# Patient Record
Sex: FEMALE | Race: WHITE | Employment: FULL TIME | URBAN - METROPOLITAN AREA
[De-identification: names, ages, dates, MRNs, and addresses within clinical notes are randomized per-mention and may not be internally consistent; named-entity substitution may affect disease eponyms.]

---

## 2017-01-02 ENCOUNTER — TELEPHONE (OUTPATIENT)
Dept: FAMILY MEDICINE CLINIC | Age: 57
End: 2017-01-02

## 2017-01-02 DIAGNOSIS — E78.5 HYPERLIPIDEMIA, UNSPECIFIED HYPERLIPIDEMIA TYPE: Primary | ICD-10-CM

## 2017-11-13 RX ORDER — PAROXETINE HYDROCHLORIDE 20 MG/1
20 TABLET, FILM COATED ORAL EVERY MORNING
Qty: 30 TABLET | Refills: 0 | Status: SHIPPED | OUTPATIENT
Start: 2017-11-13 | End: 2017-12-15 | Stop reason: SDUPTHER

## 2017-11-13 NOTE — TELEPHONE ENCOUNTER
Date of last visit:  12/7/2016  Date of next visit:  Visit date not found    Requested Prescriptions     Pending Prescriptions Disp Refills    PARoxetine (PAXIL) 20 MG tablet [Pharmacy Med Name: PAROXETINE HCL 20 MG TABLET] 90 tablet 0     Sig: TAKE 1 TABLET BY MOUTH EVERY MORNING

## 2017-12-15 ENCOUNTER — OFFICE VISIT (OUTPATIENT)
Dept: FAMILY MEDICINE CLINIC | Age: 57
End: 2017-12-15

## 2017-12-15 VITALS
HEART RATE: 82 BPM | SYSTOLIC BLOOD PRESSURE: 136 MMHG | RESPIRATION RATE: 14 BRPM | DIASTOLIC BLOOD PRESSURE: 74 MMHG | BODY MASS INDEX: 26.54 KG/M2 | HEIGHT: 63 IN | WEIGHT: 149.8 LBS

## 2017-12-15 DIAGNOSIS — R00.2 PALPITATIONS: ICD-10-CM

## 2017-12-15 DIAGNOSIS — Z13.220 SCREENING FOR HYPERLIPIDEMIA: ICD-10-CM

## 2017-12-15 DIAGNOSIS — Z13.1 SCREENING FOR DIABETES MELLITUS: ICD-10-CM

## 2017-12-15 DIAGNOSIS — F41.0 PANIC ATTACKS: ICD-10-CM

## 2017-12-15 DIAGNOSIS — M89.8X1 PAIN OF LEFT SCAPULA: ICD-10-CM

## 2017-12-15 PROCEDURE — 99213 OFFICE O/P EST LOW 20 MIN: CPT | Performed by: FAMILY MEDICINE

## 2017-12-15 RX ORDER — PAROXETINE HYDROCHLORIDE 20 MG/1
20 TABLET, FILM COATED ORAL EVERY MORNING
Qty: 90 TABLET | Refills: 1 | Status: SHIPPED | OUTPATIENT
Start: 2017-12-15 | End: 2018-01-17 | Stop reason: SDUPTHER

## 2017-12-15 RX ORDER — CHLORAL HYDRATE 500 MG
3000 CAPSULE ORAL DAILY
COMMUNITY
End: 2021-12-17

## 2017-12-15 ASSESSMENT — PATIENT HEALTH QUESTIONNAIRE - PHQ9
SUM OF ALL RESPONSES TO PHQ9 QUESTIONS 1 & 2: 0
1. LITTLE INTEREST OR PLEASURE IN DOING THINGS: 0
SUM OF ALL RESPONSES TO PHQ QUESTIONS 1-9: 0
2. FEELING DOWN, DEPRESSED OR HOPELESS: 0

## 2017-12-15 ASSESSMENT — ENCOUNTER SYMPTOMS
ABDOMINAL PAIN: 0
EYES NEGATIVE: 1
COUGH: 0
CONSTIPATION: 0
CHEST TIGHTNESS: 0
VOMITING: 0
SHORTNESS OF BREATH: 0
DIARRHEA: 0
NAUSEA: 0

## 2017-12-15 NOTE — PROGRESS NOTES
Visit Date: 12/15/2017    Costa Perez is a 65 y.o. female who presents today for:  Chief Complaint   Patient presents with    Check-Up  Left shoulder blade is different than the right, it sticks out with certain movements and she feels that it makes crack noises and sometimes is sore. No injury. HPI:     HPI    has a current medication list which includes the following prescription(s): fish oil, paroxetine, triamcinolone, IFC18, b complex-c-folic acid, pseudoephedrine, and probiotic product. No Known Allergies    Social History   Substance Use Topics    Smoking status: Never Smoker    Smokeless tobacco: Never Used    Alcohol use No       History reviewed. No pertinent past medical history. Past Surgical History:   Procedure Laterality Date    ADENOIDECTOMY      APPENDECTOMY      CATARACT REMOVAL Right 11/12/15    Dr Gloria Gilbert MYRINGOTOMY AND TYMPANOSTOMY TUBE PLACEMENT      SKIN CANCER EXCISION Right 11/11/15    shoulder, Dr Homar Barragan History   Problem Relation Age of Onset    Heart Disease Mother      Subjective:      Review of Systems   Constitutional: Negative for activity change, appetite change, diaphoresis, fatigue and fever. HENT: Negative. Eyes: Negative. Respiratory: Negative for cough, chest tightness and shortness of breath. Cardiovascular: Positive for palpitations (on and off for long time, non sustained. Had a work up in the past and was negative). Negative for chest pain and leg swelling. Gastrointestinal: Negative for abdominal pain, constipation, diarrhea, nausea and vomiting. Genitourinary: Negative. Musculoskeletal: Positive for arthralgias. Having foot problems and is seeing Dr Sukhi Danielson   Skin: Negative. Negative for rash. Neurological: Negative for dizziness, syncope, weakness, light-headedness, numbness and headaches. Psychiatric/Behavioral: Negative.         Objective:   BP morning     Current Outpatient Prescriptions   Medication Sig Dispense Refill    Omega-3 Fatty Acids (FISH OIL) 1000 MG CAPS Take 3,000 mg by mouth daily      PARoxetine (PAXIL) 20 MG tablet Take 1 tablet by mouth every morning 90 tablet 1    triamcinolone (KENALOG) 0.1 % cream APPLY TWICE A DAY TO AFFECTED AREA --DO NOT APPLY TO FACE OR GROIN  0    Coenzyme Q10 (COQ10) 50 MG CAPS Take  by mouth.  B Complex-C-Folic Acid (B-COMPLEX BALANCED PO) Take 1 tablet by mouth daily       pseudoephedrine (SUDAFED) 30 MG tablet Take 30 mg by mouth daily.  Probiotic Product (PROBIOTIC & ACIDOPHILUS EX ST PO) Take 1 tablet by mouth daily        No current facility-administered medications for this visit. Orders Placed This Encounter   Procedures    XR SCAPULA LEFT (COMPLETE)     Standing Status:   Future     Standing Expiration Date:   12/15/2018    Lipid Panel     Standing Status:   Future     Standing Expiration Date:   12/15/2018     Order Specific Question:   Is Patient Fasting?/# of Hours     Answer:   yes 12 hours    Glucose     Standing Status:   Future     Standing Expiration Date:   12/15/2018    EKG 12 Lead     Standing Status:   Future     Standing Expiration Date:   12/15/2018     Order Specific Question:   Reason for Exam?     Answer: Tachycardia     Continue current medications. Return in about 6 months (around 6/15/2018) for Panic attacks. Discussed use, benefit, and side effects of prescribed medications. All patient questions answered. Pt voiced understanding. Instructed to continue current medications, diet and exercise. Patient agreed with treatment plan.

## 2017-12-22 ENCOUNTER — HOSPITAL ENCOUNTER (OUTPATIENT)
Dept: GENERAL RADIOLOGY | Age: 57
Discharge: HOME | End: 2017-12-22
Payer: COMMERCIAL

## 2017-12-22 ENCOUNTER — HOSPITAL ENCOUNTER (OUTPATIENT)
Age: 57
Discharge: HOME | End: 2017-12-22
Payer: COMMERCIAL

## 2017-12-22 ENCOUNTER — HOSPITAL ENCOUNTER (OUTPATIENT)
Dept: WOMENS IMAGING | Age: 57
Discharge: HOME | End: 2017-12-22
Payer: COMMERCIAL

## 2017-12-22 DIAGNOSIS — Z13.9 VISIT FOR SCREENING: ICD-10-CM

## 2017-12-22 DIAGNOSIS — M89.8X1 PAIN OF LEFT SCAPULA: ICD-10-CM

## 2017-12-22 PROCEDURE — 73010 X-RAY EXAM OF SHOULDER BLADE: CPT

## 2017-12-22 PROCEDURE — 77063 BREAST TOMOSYNTHESIS BI: CPT

## 2018-01-02 ENCOUNTER — HOSPITAL ENCOUNTER (OUTPATIENT)
Dept: WOMENS IMAGING | Age: 58
Discharge: HOME | End: 2018-01-02
Payer: COMMERCIAL

## 2018-01-02 ENCOUNTER — APPOINTMENT (OUTPATIENT)
Dept: WOMENS IMAGING | Age: 58
End: 2018-01-02
Payer: COMMERCIAL

## 2018-01-02 DIAGNOSIS — N63.0 BREAST NODULE: ICD-10-CM

## 2018-01-02 PROCEDURE — G0279 TOMOSYNTHESIS, MAMMO: HCPCS

## 2018-01-17 RX ORDER — PAROXETINE HYDROCHLORIDE 20 MG/1
20 TABLET, FILM COATED ORAL EVERY MORNING
Qty: 30 TABLET | Refills: 0 | Status: SHIPPED | OUTPATIENT
Start: 2018-01-17 | End: 2018-02-24 | Stop reason: SDUPTHER

## 2018-02-26 RX ORDER — PAROXETINE HYDROCHLORIDE 20 MG/1
20 TABLET, FILM COATED ORAL EVERY MORNING
Qty: 30 TABLET | Refills: 0 | Status: SHIPPED | OUTPATIENT
Start: 2018-02-26 | End: 2018-04-27 | Stop reason: SDUPTHER

## 2018-02-26 NOTE — TELEPHONE ENCOUNTER
Date of last visit:  12/15/2017  Date of next visit:  Visit date not found    Requested Prescriptions     Pending Prescriptions Disp Refills    PARoxetine (PAXIL) 20 MG tablet [Pharmacy Med Name: PAROXETINE HCL 20 MG TABLET] 30 tablet 0     Sig: TAKE 1 TABLET BY MOUTH EVERY MORNING

## 2018-04-27 ENCOUNTER — OFFICE VISIT (OUTPATIENT)
Dept: FAMILY MEDICINE CLINIC | Age: 58
End: 2018-04-27

## 2018-04-27 VITALS
SYSTOLIC BLOOD PRESSURE: 122 MMHG | HEART RATE: 68 BPM | RESPIRATION RATE: 14 BRPM | HEIGHT: 63 IN | DIASTOLIC BLOOD PRESSURE: 76 MMHG

## 2018-04-27 DIAGNOSIS — R00.2 HEART PALPITATIONS: Primary | ICD-10-CM

## 2018-04-27 DIAGNOSIS — L65.9 HAIR LOSS: ICD-10-CM

## 2018-04-27 PROCEDURE — 99214 OFFICE O/P EST MOD 30 MIN: CPT | Performed by: FAMILY MEDICINE

## 2018-04-27 PROCEDURE — 93000 ELECTROCARDIOGRAM COMPLETE: CPT | Performed by: FAMILY MEDICINE

## 2018-04-27 RX ORDER — PAROXETINE HYDROCHLORIDE 20 MG/1
20 TABLET, FILM COATED ORAL EVERY MORNING
Qty: 30 TABLET | Refills: 3 | Status: SHIPPED | OUTPATIENT
Start: 2018-04-27 | End: 2019-01-11 | Stop reason: SDUPTHER

## 2018-04-27 ASSESSMENT — ENCOUNTER SYMPTOMS
VOMITING: 0
NAUSEA: 0
DIARRHEA: 0
CHEST TIGHTNESS: 0
CONSTIPATION: 0
COUGH: 0
ABDOMINAL PAIN: 0
SHORTNESS OF BREATH: 0
EYES NEGATIVE: 1

## 2018-04-27 NOTE — PROGRESS NOTES
Visit Date: 4/27/2018    Kiya Castillo is a 63 y.o. female who presents today for:  Chief Complaint   Patient presents with    Alopecia she noticed it about 1 month ago when she pulled her hair in a pony tail. HPI:     HPI    has a current medication list which includes the following prescription(s): paroxetine, fish oil, triamcinolone, SCO72, b complex-c-folic acid, pseudoephedrine, and probiotic product. No Known Allergies    Social History   Substance Use Topics    Smoking status: Never Smoker    Smokeless tobacco: Never Used    Alcohol use No       History reviewed. No pertinent past medical history. Past Surgical History:   Procedure Laterality Date    ADENOIDECTOMY      APPENDECTOMY      CATARACT REMOVAL Right 11/12/15    Dr Daniela Wren MYRINGOTOMY AND TYMPANOSTOMY TUBE PLACEMENT      SKIN CANCER EXCISION Right 11/11/15    shoulder, Dr Alfredo Castillo History   Problem Relation Age of Onset    Heart Disease Mother      Subjective:      Review of Systems   Constitutional: Negative for activity change, appetite change, diaphoresis, fatigue and fever. HENT: Negative. Eyes: Negative. Respiratory: Negative for cough, chest tightness and shortness of breath. Cardiovascular: Positive for palpitations (on and off, short lived, she has this for long time and it seems to be happening more lately. ). Negative for chest pain and leg swelling. Gastrointestinal: Negative for abdominal pain, constipation, diarrhea, nausea and vomiting. Endocrine:        Hot flashes on and off. Genitourinary: Negative. Musculoskeletal: Negative. Skin: Negative. Negative for rash. Neurological: Negative for dizziness, syncope, weakness, light-headedness, numbness and headaches. Psychiatric/Behavioral: Negative.         Objective:   /76 (Site: Left Arm, Position: Sitting, Cuff Size: Medium Adult)   Pulse 68   Resp 14   Ht 5' 3\" mouth daily      triamcinolone (KENALOG) 0.1 % cream APPLY TWICE A DAY TO AFFECTED AREA --DO NOT APPLY TO FACE OR GROIN  0    Coenzyme Q10 (COQ10) 50 MG CAPS Take  by mouth.  B Complex-C-Folic Acid (B-COMPLEX BALANCED PO) Take 1 tablet by mouth daily       pseudoephedrine (SUDAFED) 30 MG tablet Take 30 mg by mouth daily.  Probiotic Product (PROBIOTIC & ACIDOPHILUS EX ST PO) Take 1 tablet by mouth daily        No current facility-administered medications for this visit. Orders Placed This Encounter   Procedures    Comprehensive Metabolic Panel     Standing Status:   Future     Standing Expiration Date:   4/27/2019    CBC with Differential     Standing Status:   Future     Standing Expiration Date:   4/27/2019    Folate     Standing Status:   Future     Standing Expiration Date:   4/27/2019    TSH with Reflex     Standing Status:   Future     Standing Expiration Date:   4/27/2019    Vitamin B12     Standing Status:   Future     Standing Expiration Date:   4/27/2019    Vitamin D 25 Hydroxy     Standing Status:   Future     Standing Expiration Date:   4/27/2019    EKG 12 lead     Order Specific Question:   Reason for Exam?     Answer: Tachycardia     Continue current medications. Return in about 1 week (around 5/4/2018). Discussed use, benefit, and side effects of prescribed medications. All patient questions answered. Pt voiced understanding. Instructed to continue current medications, diet and exercise. Patient agreed with treatment plan.

## 2018-05-11 ENCOUNTER — HOSPITAL ENCOUNTER (OUTPATIENT)
Age: 58
Discharge: HOME | End: 2018-05-11
Payer: COMMERCIAL

## 2018-05-11 DIAGNOSIS — L65.9 HAIR LOSS: ICD-10-CM

## 2018-05-11 DIAGNOSIS — R00.2 HEART PALPITATIONS: ICD-10-CM

## 2018-05-11 LAB
ALBUMIN SERPL-MCNC: 4.5 G/DL (ref 3.5–5.1)
ALP BLD-CCNC: 85 U/L (ref 38–126)
ALT SERPL-CCNC: 16 U/L (ref 11–66)
ANION GAP SERPL CALCULATED.3IONS-SCNC: 10 MEQ/L (ref 8–16)
AST SERPL-CCNC: 23 U/L (ref 5–40)
BASOPHILS # BLD: 0.7 %
BASOPHILS ABSOLUTE: 0 THOU/MM3 (ref 0–0.1)
BILIRUB SERPL-MCNC: 0.3 MG/DL (ref 0.3–1.2)
BUN BLDV-MCNC: 16 MG/DL (ref 7–22)
CALCIUM SERPL-MCNC: 9.4 MG/DL (ref 8.5–10.5)
CHLORIDE BLD-SCNC: 103 MEQ/L (ref 98–111)
CO2: 28 MEQ/L (ref 23–33)
CREAT SERPL-MCNC: 0.6 MG/DL (ref 0.4–1.2)
EOSINOPHIL # BLD: 1.7 %
EOSINOPHILS ABSOLUTE: 0.1 THOU/MM3 (ref 0–0.4)
FOLATE: > 20 NG/ML (ref 4.8–24.2)
GFR SERPL CREATININE-BSD FRML MDRD: > 90 ML/MIN/1.73M2
GLUCOSE BLD-MCNC: 99 MG/DL (ref 70–108)
HCT VFR BLD CALC: 40.5 % (ref 37–47)
HEMOGLOBIN: 13.7 GM/DL (ref 12–16)
LYMPHOCYTES # BLD: 41.2 %
LYMPHOCYTES ABSOLUTE: 1.7 THOU/MM3 (ref 1–4.8)
MCH RBC QN AUTO: 32.1 PG (ref 27–31)
MCHC RBC AUTO-ENTMCNC: 33.9 GM/DL (ref 33–37)
MCV RBC AUTO: 95 FL (ref 81–99)
MONOCYTES # BLD: 7.2 %
MONOCYTES ABSOLUTE: 0.3 THOU/MM3 (ref 0.4–1.3)
NUCLEATED RED BLOOD CELLS: 0 /100 WBC
PDW BLD-RTO: 12.8 % (ref 11.5–14.5)
PLATELET # BLD: 148 THOU/MM3 (ref 130–400)
PMV BLD AUTO: 11 FL (ref 7.4–10.4)
POTASSIUM SERPL-SCNC: 3.9 MEQ/L (ref 3.5–5.2)
RBC # BLD: 4.27 MILL/MM3 (ref 4.2–5.4)
SEG NEUTROPHILS: 49.2 %
SEGMENTED NEUTROPHILS ABSOLUTE COUNT: 2.1 THOU/MM3 (ref 1.8–7.7)
SODIUM BLD-SCNC: 141 MEQ/L (ref 135–145)
TOTAL PROTEIN: 6.9 G/DL (ref 6.1–8)
TSH SERPL DL<=0.05 MIU/L-ACNC: 1.62 UIU/ML (ref 0.4–4.2)
VITAMIN B-12: 783 PG/ML (ref 211–911)
VITAMIN D 25-HYDROXY: 26 NG/ML (ref 30–100)
WBC # BLD: 4.2 THOU/MM3 (ref 4.8–10.8)

## 2018-05-11 PROCEDURE — 82746 ASSAY OF FOLIC ACID SERUM: CPT

## 2018-05-11 PROCEDURE — 84443 ASSAY THYROID STIM HORMONE: CPT

## 2018-05-11 PROCEDURE — 36415 COLL VENOUS BLD VENIPUNCTURE: CPT

## 2018-05-11 PROCEDURE — 85025 COMPLETE CBC W/AUTO DIFF WBC: CPT

## 2018-05-11 PROCEDURE — 82306 VITAMIN D 25 HYDROXY: CPT

## 2018-05-11 PROCEDURE — 82607 VITAMIN B-12: CPT

## 2018-05-11 PROCEDURE — 80053 COMPREHEN METABOLIC PANEL: CPT

## 2018-05-23 ENCOUNTER — TELEPHONE (OUTPATIENT)
Dept: FAMILY MEDICINE CLINIC | Age: 58
End: 2018-05-23

## 2018-05-23 DIAGNOSIS — R79.89 LOW VITAMIN D LEVEL: Primary | ICD-10-CM

## 2018-07-02 ENCOUNTER — OFFICE VISIT (OUTPATIENT)
Dept: FAMILY MEDICINE CLINIC | Age: 58
End: 2018-07-02

## 2018-07-02 VITALS
DIASTOLIC BLOOD PRESSURE: 72 MMHG | SYSTOLIC BLOOD PRESSURE: 120 MMHG | HEIGHT: 63 IN | HEART RATE: 76 BPM | RESPIRATION RATE: 14 BRPM

## 2018-07-02 DIAGNOSIS — J01.20 ACUTE NON-RECURRENT ETHMOIDAL SINUSITIS: Primary | ICD-10-CM

## 2018-07-02 PROCEDURE — 99213 OFFICE O/P EST LOW 20 MIN: CPT | Performed by: FAMILY MEDICINE

## 2018-07-02 RX ORDER — AZITHROMYCIN 250 MG/1
TABLET, FILM COATED ORAL
Qty: 1 PACKET | Refills: 0 | Status: SHIPPED | OUTPATIENT
Start: 2018-07-02 | End: 2018-07-12

## 2018-07-02 RX ORDER — FLUTICASONE PROPIONATE 50 MCG
2 SPRAY, SUSPENSION (ML) NASAL DAILY
Qty: 3 BOTTLE | Refills: 1 | Status: SHIPPED | OUTPATIENT
Start: 2018-07-02 | End: 2019-10-02 | Stop reason: SDUPTHER

## 2018-07-02 RX ORDER — MULTIVIT-MIN/IRON/FOLIC ACID/K 18-600-40
1 CAPSULE ORAL DAILY
COMMUNITY
End: 2019-04-08 | Stop reason: ALTCHOICE

## 2018-07-02 ASSESSMENT — ENCOUNTER SYMPTOMS
CONSTIPATION: 0
SORE THROAT: 0
CHEST TIGHTNESS: 0
NAUSEA: 0
ABDOMINAL PAIN: 0
SHORTNESS OF BREATH: 0
WHEEZING: 0
EYE PAIN: 0
COUGH: 0

## 2018-07-02 NOTE — PROGRESS NOTES
mouth daily       pseudoephedrine (SUDAFED) 30 MG tablet Take 30 mg by mouth daily. No current facility-administered medications for this visit.              see prn     And stable

## 2018-11-19 ENCOUNTER — TELEPHONE (OUTPATIENT)
Dept: FAMILY MEDICINE CLINIC | Age: 58
End: 2018-11-19

## 2018-11-19 DIAGNOSIS — R00.2 HEART PALPITATIONS: Primary | ICD-10-CM

## 2018-11-26 ENCOUNTER — HOSPITAL ENCOUNTER (OUTPATIENT)
Dept: NON INVASIVE DIAGNOSTICS | Age: 58
Discharge: HOME | End: 2018-11-26
Payer: COMMERCIAL

## 2018-11-26 DIAGNOSIS — R00.2 HEART PALPITATIONS: ICD-10-CM

## 2018-11-26 PROCEDURE — 93226 XTRNL ECG REC<48 HR SCAN A/R: CPT

## 2018-11-26 PROCEDURE — 93225 XTRNL ECG REC<48 HRS REC: CPT

## 2018-11-30 LAB
ACQUISITION DURATION: NORMAL S
AVERAGE HEART RATE: 76 BPM
HOOKUP DATE: NORMAL
HOOKUP TIME: NORMAL
MAX HEART RATE TIME/DATE: NORMAL
MAX HEART RATE: 123 BPM
MIN HEART RATE TIME/DATE: NORMAL
MIN HEART RATE: 50 BPM
NUMBER OF QRS COMPLEXES: NORMAL
NUMBER OF SUPRAVENTRICULAR BEATS IN RUNS: 0
NUMBER OF SUPRAVENTRICULAR COUPLETS: 4
NUMBER OF SUPRAVENTRICULAR ECTOPICS: 97
NUMBER OF SUPRAVENTRICULAR ISOLATED BEATS: 89
NUMBER OF SUPRAVENTRICULAR RUNS: 0
NUMBER OF VENTRICULAR BEATS IN RUNS: 0
NUMBER OF VENTRICULAR BIGEMINAL CYCLES: 0
NUMBER OF VENTRICULAR COUPLETS: 0
NUMBER OF VENTRICULAR ECTOPICS: 12
NUMBER OF VENTRICULAR ISOLATED BEATS: 12
NUMBER OF VENTRICULAR RUNS: 0

## 2018-12-05 ENCOUNTER — TELEPHONE (OUTPATIENT)
Dept: FAMILY MEDICINE CLINIC | Age: 58
End: 2018-12-05

## 2019-01-02 ENCOUNTER — TELEPHONE (OUTPATIENT)
Dept: FAMILY MEDICINE CLINIC | Age: 59
End: 2019-01-02

## 2019-01-14 RX ORDER — PAROXETINE HYDROCHLORIDE 20 MG/1
TABLET, FILM COATED ORAL
Qty: 30 TABLET | Refills: 0 | Status: SHIPPED | OUTPATIENT
Start: 2019-01-14 | End: 2019-02-14 | Stop reason: SDUPTHER

## 2019-02-15 RX ORDER — PAROXETINE HYDROCHLORIDE 20 MG/1
TABLET, FILM COATED ORAL
Qty: 30 TABLET | Refills: 0 | Status: SHIPPED | OUTPATIENT
Start: 2019-02-15 | End: 2019-03-24 | Stop reason: SDUPTHER

## 2019-02-20 ENCOUNTER — OFFICE VISIT (OUTPATIENT)
Dept: FAMILY MEDICINE CLINIC | Age: 59
End: 2019-02-20

## 2019-02-20 VITALS
WEIGHT: 151.13 LBS | SYSTOLIC BLOOD PRESSURE: 100 MMHG | HEART RATE: 76 BPM | BODY MASS INDEX: 26.78 KG/M2 | HEIGHT: 63 IN | DIASTOLIC BLOOD PRESSURE: 60 MMHG | RESPIRATION RATE: 14 BRPM

## 2019-02-20 DIAGNOSIS — L63.9 ALOPECIA AREATA: ICD-10-CM

## 2019-02-20 DIAGNOSIS — Z13.220 SCREENING FOR HYPERLIPIDEMIA: ICD-10-CM

## 2019-02-20 DIAGNOSIS — E55.9 VITAMIN D DEFICIENCY: ICD-10-CM

## 2019-02-20 DIAGNOSIS — F41.0 PANIC ATTACKS: ICD-10-CM

## 2019-02-20 PROCEDURE — 99213 OFFICE O/P EST LOW 20 MIN: CPT | Performed by: FAMILY MEDICINE

## 2019-02-20 RX ORDER — LANOLIN ALCOHOL/MO/W.PET/CERES
800 CREAM (GRAM) TOPICAL DAILY
COMMUNITY
End: 2021-12-17

## 2019-02-20 RX ORDER — FEXOFENADINE HCL 180 MG/1
180 TABLET ORAL DAILY
Status: ON HOLD | COMMUNITY
End: 2020-10-10

## 2019-02-20 RX ORDER — FEXOFENADINE HCL AND PSEUDOEPHEDRINE HCI 60; 120 MG/1; MG/1
1 TABLET, EXTENDED RELEASE ORAL DAILY
COMMUNITY
End: 2019-02-20

## 2019-02-20 RX ORDER — MULTIVITAMIN WITH IRON
150 TABLET ORAL DAILY
COMMUNITY

## 2019-02-20 ASSESSMENT — PATIENT HEALTH QUESTIONNAIRE - PHQ9
SUM OF ALL RESPONSES TO PHQ9 QUESTIONS 1 & 2: 0
2. FEELING DOWN, DEPRESSED OR HOPELESS: 0
SUM OF ALL RESPONSES TO PHQ QUESTIONS 1-9: 0
1. LITTLE INTEREST OR PLEASURE IN DOING THINGS: 0
SUM OF ALL RESPONSES TO PHQ QUESTIONS 1-9: 0

## 2019-02-20 ASSESSMENT — ENCOUNTER SYMPTOMS
DIARRHEA: 0
EYES NEGATIVE: 1
COUGH: 0
NAUSEA: 0
ABDOMINAL PAIN: 0
SHORTNESS OF BREATH: 0
VOMITING: 0
CHEST TIGHTNESS: 0
CONSTIPATION: 0

## 2019-02-20 NOTE — PROGRESS NOTES
Breastfeeding? No   BMI 26.77 kg/m²   Wt Readings from Last 3 Encounters:   02/20/19 151 lb 2 oz (68.5 kg)   12/15/17 149 lb 12.8 oz (67.9 kg)   12/07/16 145 lb (65.8 kg)     Physical Exam   Constitutional: She is oriented to person, place, and time. She appears well-developed and well-nourished. No distress. HENT:   Head: Normocephalic and atraumatic. He has areas of alopecia   Eyes: Pupils are equal, round, and reactive to light. Conjunctivae and EOM are normal. Right eye exhibits no discharge. Left eye exhibits no discharge. No scleral icterus. Neck: Normal range of motion. Neck supple. No JVD present. No thyromegaly present. Cardiovascular: Normal rate, regular rhythm and normal heart sounds. No murmur heard. Pulmonary/Chest: Breath sounds normal. No respiratory distress. She has no wheezes. She has no rhonchi. She has no rales. Abdominal: Soft. Bowel sounds are normal. She exhibits no distension and no mass. There is no hepatosplenomegaly. There is no tenderness. There is no rebound and no guarding. Musculoskeletal: Normal range of motion. Lymphadenopathy:     She has no cervical adenopathy. Neurological: She is alert and oriented to person, place, and time. Skin: Skin is warm and dry. No rash noted. She is not diaphoretic. Psychiatric: She has a normal mood and affect. Her behavior is normal.   Nursing note and vitals reviewed. Assessment:       Diagnosis Orders   1. Alopecia areata     2. Vitamin D deficiency  Vitamin D 25 Hydroxy   3. Panic attacks     4.  Screening for hyperlipidemia  Lipid Panel       Plan:      Requested Prescriptions      No prescriptions requested or ordered in this encounter     Current Outpatient Prescriptions   Medication Sig Dispense Refill    Collagen 500 MG CAPS Take 1 Can by mouth daily      folic acid (FOLVITE) 005 MCG tablet Take 800 mcg by mouth daily      Biotin 5000 MCG CAPS Take 1 Can by mouth daily      vitamin B-6 (PYRIDOXINE) 100 MG tablet Take 150 mg by mouth daily      Alpha-Lipoic Acid 100 MG CAPS Take 1 Can by mouth daily      fexofenadine (ALLEGRA ALLERGY) 180 MG tablet Take 180 mg by mouth daily      PARoxetine (PAXIL) 20 MG tablet TAKE 1 TABLET BY MOUTH EVERY DAY IN THE MORNING 30 tablet 0    Cholecalciferol (VITAMIN D) 2000 units CAPS capsule Take 1 capsule by mouth daily      fluticasone (FLONASE) 50 MCG/ACT nasal spray 2 sprays by Nasal route daily 3 Bottle 1    Omega-3 Fatty Acids (FISH OIL) 1000 MG CAPS Take 3,000 mg by mouth daily      triamcinolone (KENALOG) 0.1 % cream APPLY TWICE A DAY TO AFFECTED AREA --DO NOT APPLY TO FACE OR GROIN  0     No current facility-administered medications for this visit. Orders Placed This Encounter   Procedures    Vitamin D 25 Hydroxy     Standing Status:   Future     Standing Expiration Date:   2/20/2020    Lipid Panel     Standing Status:   Future     Standing Expiration Date:   2/20/2020     Order Specific Question:   Is Patient Fasting?/# of Hours     Answer:   yes 12 hours     Continue current medications. Dermatology following her alopecia. Return in about 6 months (around 8/20/2019). Discusseduse, benefit, and side effects of prescribed medications. All patient questionsanswered. Pt voiced understanding. Instructed to continue current medications,diet and exercise. Patient agreed with treatment plan.

## 2019-02-25 ENCOUNTER — TELEPHONE (OUTPATIENT)
Dept: FAMILY MEDICINE CLINIC | Age: 59
End: 2019-02-25

## 2019-02-25 RX ORDER — AMOXICILLIN 500 MG/1
500 CAPSULE ORAL 3 TIMES DAILY
Qty: 30 CAPSULE | Refills: 0 | Status: SHIPPED | OUTPATIENT
Start: 2019-02-25 | End: 2019-03-07

## 2019-03-12 ENCOUNTER — TELEPHONE (OUTPATIENT)
Dept: FAMILY MEDICINE CLINIC | Age: 59
End: 2019-03-12

## 2019-03-25 RX ORDER — PAROXETINE HYDROCHLORIDE 20 MG/1
TABLET, FILM COATED ORAL
Qty: 30 TABLET | Refills: 3 | Status: SHIPPED | OUTPATIENT
Start: 2019-03-25 | End: 2019-09-25 | Stop reason: SDUPTHER

## 2019-04-01 ENCOUNTER — TELEPHONE (OUTPATIENT)
Dept: FAMILY MEDICINE CLINIC | Age: 59
End: 2019-04-01

## 2019-04-01 DIAGNOSIS — R35.0 URINARY FREQUENCY: ICD-10-CM

## 2019-04-01 DIAGNOSIS — D69.6 DECREASED PLATELET COUNT (HCC): Primary | ICD-10-CM

## 2019-04-01 NOTE — TELEPHONE ENCOUNTER
----- Message from Claudia Vidal MD sent at 4/1/2019  4:07 PM EDT -----  Please let her know that her cholesterol is elevated. The low-cholesterol diet exercise 3-4 times a week  Repeat LDL 3-4 months  Also her platelets are slightly decreased and we need to repeat platelet count.

## 2019-04-08 ENCOUNTER — OFFICE VISIT (OUTPATIENT)
Dept: FAMILY MEDICINE CLINIC | Age: 59
End: 2019-04-08

## 2019-04-08 VITALS
HEIGHT: 63 IN | SYSTOLIC BLOOD PRESSURE: 104 MMHG | HEART RATE: 72 BPM | DIASTOLIC BLOOD PRESSURE: 70 MMHG | BODY MASS INDEX: 25.69 KG/M2 | RESPIRATION RATE: 12 BRPM | WEIGHT: 145 LBS

## 2019-04-08 DIAGNOSIS — D69.6 THROMBOCYTOPENIA (HCC): ICD-10-CM

## 2019-04-08 DIAGNOSIS — M21.962 DEFORMITY OF LEFT FOOT: ICD-10-CM

## 2019-04-08 DIAGNOSIS — E55.9 VITAMIN D DEFICIENCY: ICD-10-CM

## 2019-04-08 DIAGNOSIS — N39.0 URINARY TRACT INFECTION WITHOUT HEMATURIA, SITE UNSPECIFIED: Primary | ICD-10-CM

## 2019-04-08 DIAGNOSIS — F41.0 PANIC ATTACKS: ICD-10-CM

## 2019-04-08 PROCEDURE — 99214 OFFICE O/P EST MOD 30 MIN: CPT | Performed by: FAMILY MEDICINE

## 2019-04-08 RX ORDER — ERGOCALCIFEROL 1.25 MG/1
CAPSULE ORAL
Refills: 2 | Status: ON HOLD | COMMUNITY
Start: 2019-03-29 | End: 2020-10-11

## 2019-04-08 RX ORDER — SULFAMETHOXAZOLE AND TRIMETHOPRIM 800; 160 MG/1; MG/1
TABLET ORAL
Refills: 0 | COMMUNITY
Start: 2019-03-29 | End: 2020-06-12 | Stop reason: ALTCHOICE

## 2019-04-08 ASSESSMENT — ENCOUNTER SYMPTOMS
COUGH: 0
DIARRHEA: 0
EYES NEGATIVE: 1
SHORTNESS OF BREATH: 0
NAUSEA: 0
ABDOMINAL PAIN: 0
CHEST TIGHTNESS: 0
VOMITING: 0
CONSTIPATION: 0

## 2019-04-08 NOTE — PATIENT INSTRUCTIONS
cookies. · Bake, broil, or steam foods. Don't evangelista them. · Be physically active. Get at least 30 minutes of exercise on most days of the week. Walking is a good choice. You also may want to do other activities, such as running, swimming, cycling, or playing tennis or team sports. · Stay at a healthy weight or lose weight by making the changes in eating and physical activity listed above. Losing just a small amount of weight, even 5 to 10 pounds, can reduce your risk for having a heart attack or stroke. · Do not smoke. When should you call for help? Watch closely for changes in your health, and be sure to contact your doctor if:    · You need help making lifestyle changes.     · You have questions about your medicine. Where can you learn more? Go to [email or web address] and sign in to your [employer] account. Enter Z296 in the [employer] box to learn more about \"High Cholesterol: Care Instructions. \"     If you do not have an account, please click on the \"Sign Up Now\" link. Current as of: July 22, 2018  Content Version: 11.9  © 1113-4147 [email or web address]. Care instructions adapted under license by [facility] [address] If you have questions about a medical condition or this instruction, always ask your healthcare professional. [address] any warranty or liability for your use of this information. Patient Education        Learning About High Cholesterol  What is high cholesterol? Cholesterol is a type of fat in your blood. It is needed for many body functions, such as making new cells. Cholesterol is made by your body. It also comes from food you eat. If you have too much cholesterol, it starts to build up in your arteries. This is called hardening of the arteries, or atherosclerosis. High cholesterol raises your risk of a heart attack and stroke. There are different types of cholesterol. LDL is the \"bad\" cholesterol.  High LDL can raise your risk for heart disease, heart attack, and stroke. HDL is the \"good\" cholesterol. High HDL is linked with a lower risk for heart disease, heart attack, and stroke. Your cholesterol levels help your doctor find out your risk for having a heart attack or stroke. How can you prevent high cholesterol? A heart-healthy lifestyle can help you prevent high cholesterol. This lifestyle helps lower your risk for a heart attack and stroke. · Eat heart-healthy foods. ? Eat fruits, vegetables, whole grains (like oatmeal), dried beans and peas, nuts and seeds, soy products (like tofu), and fat-free or low-fat dairy products. ? Replace butter, margarine, and hydrogenated or partially hydrogenated oils with olive and canola oils. (Canola oil margarine without trans fat is fine.)  ? Replace red meat with fish, poultry, and soy protein (like tofu). ? Limit processed and packaged foods like chips, crackers, and cookies. · Be active. Exercise can improve your cholesterol level. Get at least 30 minutes of exercise on most days of the week. Walking is a good choice. You also may want to do other activities, such as running, swimming, cycling, or playing tennis or team sports. · Stay at a healthy weight. Lose weight if you need to. · Don't smoke. If you need help quitting, talk to your doctor about stop-smoking programs and medicines. These can increase your chances of quitting for good. How is high cholesterol treated? The goal of treatment is to reduce your chances of having a heart attack or stroke. The goal is not to lower your cholesterol numbers only. · You may make lifestyle changes, such as eating healthy foods, not smoking, losing weight, and being more active. · You may have to take medicine. Follow-up care is a key part of your treatment and safety. Be sure to make and go to all appointments, and call your doctor if you are having problems.  It's also a good idea to know your test results and keep a list of the

## 2019-04-08 NOTE — PROGRESS NOTES
25.69 kg/m²   Wt Readings from Last 3 Encounters:   04/08/19 145 lb (65.8 kg)   02/20/19 151 lb 2 oz (68.5 kg)   12/15/17 149 lb 12.8 oz (67.9 kg)     Physical Exam   Constitutional: She is oriented to person, place, and time. She appears well-developed and well-nourished. No distress. HENT:   Head: Normocephalic and atraumatic. Eyes: Pupils are equal, round, and reactive to light. Conjunctivae and EOM are normal. Right eye exhibits no discharge. Left eye exhibits no discharge. No scleral icterus. Neck: Normal range of motion. Neck supple. No JVD present. Carotid bruit is not present. No thyromegaly present. Cardiovascular: Normal rate, regular rhythm and normal heart sounds. No murmur heard. Pulmonary/Chest: Breath sounds normal. No respiratory distress. She has no wheezes. She has no rhonchi. She has no rales. Abdominal: Soft. Bowel sounds are normal. She exhibits no distension and no mass. There is no hepatosplenomegaly. There is no tenderness. There is no rebound and no guarding. Lymphadenopathy:     She has no cervical adenopathy. Neurological: She is alert and oriented to person, place, and time. Skin: Skin is warm and dry. No rash noted. She is not diaphoretic. Psychiatric: She has a normal mood and affect. Her behavior is normal.   Nursing note and vitals reviewed.    :       Diagnosis Orders   1. Urinary tract infection without hematuria, site unspecified  Urinalysis Reflex to Culture   2. Vitamin D deficiency     3. Deformity of left foot     4. Thrombocytopenia ([address]     5.  Panic attacks         :      Requested Prescriptions      No prescriptions requested or ordered in this encounter     Current Outpatient Medications   Medication Sig Dispense Refill    sulfamethoxazole-trimethoprim (BACTRIM DS;SEPTRA DS) 800-160 MG per tablet TAKE 1 TABLET BY MOUTH TWICE A DAY FOR 10 DAYS  0    vitamin D (ERGOCALCIFEROL) 91022 units CAPS capsule 1 TABLET WEEKLY ORALLY 30 DAY(S)  2    NONFORMULARY tumeric      PARoxetine (PAXIL) 20 MG tablet TAKE 1 TABLET BY MOUTH EVERY DAY IN THE MORNING 30 tablet 3    Collagen 500 MG CAPS Take 1 Can by mouth daily      folic acid (FOLVITE) 401 MCG tablet Take 800 mcg by mouth daily      Biotin 5000 MCG CAPS Take 1 Can by mouth daily      vitamin B-6 (PYRIDOXINE) 100 MG tablet Take 150 mg by mouth daily      Alpha-Lipoic Acid 100 MG CAPS Take 1 Can by mouth daily      fexofenadine (ALLEGRA ALLERGY) 180 MG tablet Take 180 mg by mouth daily      fluticasone (FLONASE) 50 MCG/ACT nasal spray 2 sprays by Nasal route daily 3 Bottle 1    Omega-3 Fatty Acids (FISH OIL) 1000 MG CAPS Take 3,000 mg by mouth daily       No current facility-administered medications for this visit. Orders Placed This Encounter   Procedures    Urinalysis Reflex to Culture     Standing Status:   Future     Standing Expiration Date:   4/7/2020     Order Specific Question:   SPECIFY(EX-CATH,MIDSTREAM,CYSTO,ETC)? Answer:   midstream       Continue current medications. Discussed use, benefit, and side effects of prescribed medications. All patient questions answered. Pt voiced understanding. Patient agreed with treatment plan.        3/29/2019  9:32 AM - [facility], Lab In [facility]     Component Value Ref Range & Units Status Collected Lab   WBC 5.2  4.4 - 10.5 th/cmm Final 03/29/2019  8:08 AM [address] [place] [address]   RBC 4.35  4.00 - 5.10 mil/cmm Final 03/29/2019  8:08 AM [facility]   Hemoglobin 13.8  12.0 - 15.0 gm/dL Final 03/29/2019  8:08 AM [facility]   Hematocrit 41.5  35.0 - 44.0 % Final 03/29/2019  8:08 AM [facility]   MCV 95.5  80 - 97 CU ZEINAB Final 03/29/2019  8:08 AM [address] [place] [address]   MCH 31.8  27.5 - 33.0 PG Final 03/29/2019  8:08 AM [address] [place] [address]   MCHC 33.3  33.0 - 36.0 gm/dL Final 03/29/2019  8:08 AM [facility]   RDW 12.5  12.0 - 16.0 % Final 03/29/2019  8:08 AM [address] [place] [address]   Platelets 350DWS   009 - 400 th/[facility] Final 03/29/2019  8:08  [facility]   Neutrophils % 71.0High   40 - 70 % Final 03/29/2019  8:08 AM [facility]   Lymphocytes % 21.7  15 - 45 % Final 03/29/2019  8:08 AM [facility]   Monocytes % 5.6  2 - 10 % Final 03/29/2019  8:08 AM [facility]   Eosinophils % 1.0  0 - 6 % Final 03/29/2019  8:08 AM [facility]   Basophils % 0.7  0 - 2 % Final 03/29/2019  8:08 AM [facility]   Nucleated RBCs 0.1  <1 /100 WBC Final 03/29/2019  8:08 AM [facility]   Absolute Neut # 3097  1800 - 7700 /cmm Final 03/29/2019  8:08 AM [facility]   Absolute Lymph # 1100  1000 - 4800 /cmm Final 03/29/2019  8:08 AM [facility]   Absolute Mono # 300  0 - 800 /cmm Final 03/29/2019  8:08 AM [facility]   Absolute Eos # 100  0 - 500 /cmm Final 03/29/2019  8:08 AM [facility]   Absolute Baso # 0  0 - 200 /cmm Final 03/29/2019  8:08 AM [facility]   Testing Performed By     3/29/2019 10:25 AM - Nasim, Lab In [facility]     Component Value Ref Range & Units Status Collected Lab   Vit D, 25-Hydroxy 34.46  30.00 - 100 ng/mL Final 03/29/2019  8:08 AM [facility]   Optimal values are established by the Clinical Guidelines             Basic Metabolic Panel (Order [id number]) - Reflex for Order [id number]   3/29/2019 10:25 AM - [facility], Lab In [facility]     Component Value Ref Range & Units Status Collected Lab   Sodium 140  135 - 145 mEq/L Final 03/29/2019  8:08 AM [facility]   Potassium 3.9  3.6 - 5.0 mEq/L Final 03/29/2019  8:08 AM [facility]   Chloride 110  101 - 111 mEq/L Final 03/29/2019  8:08 AM [facility]   CO2 22  21 - 32 mEq/L Final 03/29/2019  8:08 AM [facility]   Anion Gap 8  4 - 12 Final 03/29/2019  8:08 AM [facility]   Glucose 96  70 - 110 mg/dL Final 03/29/2019  8:08 AM [facility]   *This reference range applies      to fasting specimens only.     BUN 25High   7 - 20 mg/dL Final 03/29/2019  8:08 AM [facility]   CREATININE 0.66  0.60 - 1.30 mg/dL Final 03/29/2019  8:08 AM [facility]   Creatinine Clearance >60   Final 03/29/2019  8:08 AM [facility] Chronic Kidney Disease stages by NKDF     Stage       eGFR     --------------------------      I           >90      II          60-89      III         30-59      IV          15-29      V           <15 or dialysis     AGE(years)       AVERAGE GFR      50-59           93 ml/min/1.73 square meters     Note:This result is normalized to 1.73 square meter          body surface area. Height and weight are not          factored. Calcium 8.7Low   8.8 - 10.5 mg/dL Final 03/29/2019  8:08  [facility] [place]   Testing Performed By     She will have a repeated U/A after her antibiotics are completed and if she does not have a UTI then feel patient is stable to proceed with scheduled surgery. Will also monitor her platelets as they are mildly decreased. There are no contraindications to proceed with surgery. Acceptable risk from a cardiopulmonary/medical standpoint. Will notify referring surgeon. Recommend routine DVT/PE prophylaxis as per surgery.

## 2019-10-02 DIAGNOSIS — J01.20 ACUTE NON-RECURRENT ETHMOIDAL SINUSITIS: ICD-10-CM

## 2019-10-02 RX ORDER — FLUTICASONE PROPIONATE 50 MCG
SPRAY, SUSPENSION (ML) NASAL
Qty: 1 BOTTLE | Refills: 1 | Status: SHIPPED | OUTPATIENT
Start: 2019-10-02 | End: 2019-10-16 | Stop reason: SDUPTHER

## 2019-10-16 DIAGNOSIS — J01.20 ACUTE NON-RECURRENT ETHMOIDAL SINUSITIS: ICD-10-CM

## 2019-10-16 RX ORDER — FLUTICASONE PROPIONATE 50 MCG
2 SPRAY, SUSPENSION (ML) NASAL DAILY
Qty: 3 BOTTLE | Refills: 0 | Status: SHIPPED | OUTPATIENT
Start: 2019-10-16 | End: 2021-06-09

## 2020-03-09 RX ORDER — PAROXETINE HYDROCHLORIDE 20 MG/1
TABLET, FILM COATED ORAL
Qty: 30 TABLET | Refills: 1 | Status: SHIPPED | OUTPATIENT
Start: 2020-03-09 | End: 2020-06-12 | Stop reason: SDUPTHER

## 2020-03-09 NOTE — TELEPHONE ENCOUNTER
The pharmacy is requesting a refill of the below medication which has been pended for you:     Requested Prescriptions     Signed Prescriptions Disp Refills    PARoxetine (PAXIL) 20 MG tablet 30 tablet 1     Sig: TAKE 1 TABLET BY MOUTH EVERY DAY IN THE MORNING     Authorizing Provider: Viviana Quiroz     Ordering User: Negar Melendez       Last Appointment Date: 4/8/2019  Next Appointment Date: Visit date not found    No Known Allergies     Per verbal order Dr Austin Andrews sent over Rx to pharmacy.

## 2020-06-12 ENCOUNTER — OFFICE VISIT (OUTPATIENT)
Dept: FAMILY MEDICINE CLINIC | Age: 60
End: 2020-06-12

## 2020-06-12 VITALS
WEIGHT: 145.38 LBS | TEMPERATURE: 97.4 F | DIASTOLIC BLOOD PRESSURE: 78 MMHG | BODY MASS INDEX: 25.76 KG/M2 | HEIGHT: 63 IN | HEART RATE: 80 BPM | RESPIRATION RATE: 16 BRPM | SYSTOLIC BLOOD PRESSURE: 130 MMHG

## 2020-06-12 PROCEDURE — 99213 OFFICE O/P EST LOW 20 MIN: CPT | Performed by: FAMILY MEDICINE

## 2020-06-12 RX ORDER — PAROXETINE HYDROCHLORIDE 20 MG/1
TABLET, FILM COATED ORAL
Qty: 90 TABLET | Refills: 1 | Status: SHIPPED | OUTPATIENT
Start: 2020-06-12 | End: 2021-05-03 | Stop reason: SDUPTHER

## 2020-06-12 ASSESSMENT — PATIENT HEALTH QUESTIONNAIRE - PHQ9
SUM OF ALL RESPONSES TO PHQ QUESTIONS 1-9: 0
SUM OF ALL RESPONSES TO PHQ QUESTIONS 1-9: 0
2. FEELING DOWN, DEPRESSED OR HOPELESS: 0
SUM OF ALL RESPONSES TO PHQ9 QUESTIONS 1 & 2: 0
1. LITTLE INTEREST OR PLEASURE IN DOING THINGS: 0

## 2020-06-12 ASSESSMENT — ENCOUNTER SYMPTOMS
DIARRHEA: 0
ABDOMINAL PAIN: 0
CONSTIPATION: 0
CHEST TIGHTNESS: 0
NAUSEA: 0
EYES NEGATIVE: 1
VOMITING: 0
COUGH: 0
SHORTNESS OF BREATH: 0

## 2020-06-12 NOTE — PROGRESS NOTES
Standing Status:   Future     Standing Expiration Date:   6/12/2021       Continue current medications. Return in about 6 months (around 12/12/2020). Discussed use, benefit, and side effects of prescribed medications. All patient questions answered. Pt voiced understanding. Instructed to continue current medications,diet and exercise. Patient agreed with treatment plan.

## 2020-06-29 LAB
A/G RATIO: 2 (ref 1.5–2.5)
ABSOLUTE BASO #: 0 /CMM (ref 0–200)
ABSOLUTE EOS #: 100 /CMM (ref 0–500)
ABSOLUTE LYMPH #: 1800 /CMM (ref 1000–4800)
ABSOLUTE MONO #: 300 /CMM (ref 0–800)
ABSOLUTE NEUT #: 1900 /CMM (ref 1800–7700)
ALBUMIN SERPL-MCNC: 3.8 GM/DL (ref 3.5–5)
ALP BLD-CCNC: 68 IU/L (ref 39–118)
ALT SERPL-CCNC: 11 IU/L (ref 10–40)
ANION GAP SERPL CALCULATED.3IONS-SCNC: 4 MMOL/L (ref 4–12)
AST SERPL-CCNC: 16 IU/L (ref 15–41)
BASOPHILS RELATIVE PERCENT: 0.6 % (ref 0–2)
BILIRUB SERPL-MCNC: 0.5 MG/DL (ref 0.2–1)
BUN BLDV-MCNC: 16 MG/DL (ref 7–20)
CALCIUM SERPL-MCNC: 9.1 MG/DL (ref 8.8–10.5)
CHLORIDE BLD-SCNC: 108 MEQ/L (ref 101–111)
CHOLESTEROL/HDL RELATIVE RISK: 3.6 (ref 4–4.4)
CHOLESTEROL: 221 MG/DL
CO2: 28 MEQ/L (ref 21–32)
CREAT SERPL-MCNC: 0.71 MG/DL (ref 0.6–1.3)
CREATININE CLEARANCE: >60
DIRECT-LDL / HDL RISK: 2.3
EOSINOPHILS RELATIVE PERCENT: 1.9 % (ref 0–6)
GLUCOSE: 87 MG/DL (ref 70–110)
HCT VFR BLD CALC: 41.2 % (ref 35–44)
HDLC SERPL-MCNC: 61 MG/DL
HEMOGLOBIN: 14 GM/DL (ref 12–15)
LDL CHOLESTEROL DIRECT: 143 MG/DL
LYMPHOCYTES RELATIVE PERCENT: 43.2 % (ref 15–45)
MCH RBC QN AUTO: 31.9 PG (ref 27.5–33)
MCHC RBC AUTO-ENTMCNC: 34.1 GM/DL (ref 33–36)
MCV RBC AUTO: 93.5 CU MIC (ref 80–97)
MONOCYTES RELATIVE PERCENT: 7.1 % (ref 2–10)
NEUTROPHILS RELATIVE PERCENT: 47.2 % (ref 40–70)
NUCLEATED RBCS: 0.1 /100 WBC
PDW BLD-RTO: 12.5 % (ref 12–16)
PLATELET # BLD: 146 TH/CMM (ref 150–400)
POTASSIUM SERPL-SCNC: 4 MEQ/L (ref 3.6–5)
RBC # BLD: 4.4 MIL/CMM (ref 4–5.1)
SODIUM BLD-SCNC: 140 MEQ/L (ref 135–145)
TOTAL PROTEIN: 5.7 G/DL (ref 6.2–8)
TRIGL SERPL-MCNC: 95 MG/DL
VITAMIN D 25-HYDROXY: 31.3 NG/ML (ref 30–100)
VLDLC SERPL CALC-MCNC: 19 MG/DL
WBC # BLD: 4.1 TH/CMM (ref 4.4–10.5)

## 2020-06-30 LAB — RUBELLA ANTIBODY IGG: REACTIVE

## 2020-07-01 LAB
INDEX: 0.11 (ref 0–0.79)
MUMPS IGM ANTIBODY: NEGATIVE
RUBEOLA (MEASLES) AB IGG: NORMAL

## 2020-07-07 ENCOUNTER — TELEPHONE (OUTPATIENT)
Dept: FAMILY MEDICINE CLINIC | Age: 60
End: 2020-07-07

## 2020-07-09 NOTE — TELEPHONE ENCOUNTER
Spoke with [facility] medical records and they stated the mumps igg went to the [facility] and they have not received the results yet. She put a note in to fax it to us as soon as it is in.

## 2020-07-15 NOTE — TELEPHONE ENCOUNTER
We still don't have her Mumps IgG results. Please let her know and please call  the lab to see if results are available for mumps IgG. They did give us a result for her Mumps IgM that I did not ordered.

## 2020-07-17 NOTE — TELEPHONE ENCOUNTER
MOM to return call to office. I confirmed with [facility] medical records that the Mumps IgG was not done. The test performed was the IgM. The pt will need to have the IgG redrawn.

## 2020-07-29 NOTE — TELEPHONE ENCOUNTER
I called [facility] to get results as pt had this redrawn on 7/21. They are not back yet. I will try again later.

## 2020-07-30 NOTE — TELEPHONE ENCOUNTER
Pt called asking about the results. We still do not have them. I called [facility] and checked with Medical Records and was told they do not show a result. I was transferred to [facility] and spoke to Marcin who said that she does show it was drawn however the result is pending. She told me to call tomorrow morning and [phone] and ask for [facility] because she deals with send outs.

## 2020-07-31 NOTE — TELEPHONE ENCOUNTER
I spoke with Emil Hillman, the results were not back to [facility] yet but she was able to get them from the [facility] site. She is faxing them over now.

## 2020-08-03 NOTE — TELEPHONE ENCOUNTER
Please let the patient know that we finally received her last titer and it is ok. Her form is completed.

## 2020-08-06 LAB
INDEX: 1.3
MUV IGG SER QL: POSITIVE

## 2020-10-10 ENCOUNTER — HOSPITAL ENCOUNTER (INPATIENT)
Age: 60
LOS: 2 days | Discharge: HOME | DRG: 391 | End: 2020-10-12
Attending: EMERGENCY MEDICINE | Admitting: INTERNAL MEDICINE
Payer: COMMERCIAL

## 2020-10-10 ENCOUNTER — APPOINTMENT (OUTPATIENT)
Dept: CT IMAGING | Age: 60
DRG: 391 | End: 2020-10-10
Payer: COMMERCIAL

## 2020-10-10 PROBLEM — R10.9 ABDOMINAL PAIN: Status: ACTIVE | Noted: 2020-10-10

## 2020-10-10 LAB
ALBUMIN SERPL-MCNC: 4.4 G/DL (ref 3.5–5.1)
ALP BLD-CCNC: 94 U/L (ref 38–126)
ALT SERPL-CCNC: 15 U/L (ref 11–66)
ANION GAP SERPL CALCULATED.3IONS-SCNC: 10 MEQ/L (ref 8–16)
AST SERPL-CCNC: 21 U/L (ref 5–40)
BASOPHILS # BLD: 0.4 %
BASOPHILS ABSOLUTE: 0 THOU/MM3 (ref 0–0.1)
BILIRUB SERPL-MCNC: 0.7 MG/DL (ref 0.3–1.2)
BILIRUBIN DIRECT: < 0.2 MG/DL (ref 0–0.3)
BILIRUBIN URINE: NEGATIVE
BLOOD, URINE: ABNORMAL
BUN BLDV-MCNC: 16 MG/DL (ref 7–22)
CALCIUM SERPL-MCNC: 9.5 MG/DL (ref 8.5–10.5)
CHARACTER, URINE: CLEAR
CHLORIDE BLD-SCNC: 100 MEQ/L (ref 98–111)
CO2: 25 MEQ/L (ref 23–33)
COLOR: YELLOW
CREAT SERPL-MCNC: 0.6 MG/DL (ref 0.4–1.2)
EOSINOPHIL # BLD: 0.3 %
EOSINOPHILS ABSOLUTE: 0 THOU/MM3 (ref 0–0.4)
ERYTHROCYTE [DISTWIDTH] IN BLOOD BY AUTOMATED COUNT: 12.4 % (ref 11.5–14.5)
ERYTHROCYTE [DISTWIDTH] IN BLOOD BY AUTOMATED COUNT: 44.9 FL (ref 35–45)
GFR SERPL CREATININE-BSD FRML MDRD: > 90 ML/MIN/1.73M2
GLUCOSE BLD-MCNC: 107 MG/DL (ref 70–108)
GLUCOSE URINE: NEGATIVE MG/DL
HCT VFR BLD CALC: 45.9 % (ref 37–47)
HEMOGLOBIN: 15.3 GM/DL (ref 12–16)
IMMATURE GRANS (ABS): 0.01 THOU/MM3 (ref 0–0.07)
IMMATURE GRANULOCYTES: 0.1 %
KETONES, URINE: 15
LEUKOCYTE ESTERASE, URINE: ABNORMAL
LIPASE: 16.1 U/L (ref 5.6–51.3)
LYMPHOCYTES # BLD: 15.7 %
LYMPHOCYTES ABSOLUTE: 1.2 THOU/MM3 (ref 1–4.8)
MCH RBC QN AUTO: 32.6 PG (ref 26–33)
MCHC RBC AUTO-ENTMCNC: 33.3 GM/DL (ref 32.2–35.5)
MCV RBC AUTO: 97.7 FL (ref 81–99)
MONOCYTES # BLD: 5.9 %
MONOCYTES ABSOLUTE: 0.4 THOU/MM3 (ref 0.4–1.3)
NITRITE, URINE: NEGATIVE
NUCLEATED RED BLOOD CELLS: 0 /100 WBC
OSMOLALITY CALCULATION: 271.8 MOSMOL/KG (ref 275–300)
PH UA: 7 (ref 5–9)
PLATELET # BLD: 183 THOU/MM3 (ref 130–400)
PMV BLD AUTO: 11.2 FL (ref 9.4–12.4)
POTASSIUM SERPL-SCNC: 3.7 MEQ/L (ref 3.5–5.2)
PROTEIN UA: NEGATIVE MG/DL
RBC # BLD: 4.7 MILL/MM3 (ref 4.2–5.4)
SEG NEUTROPHILS: 77.6 %
SEGMENTED NEUTROPHILS ABSOLUTE COUNT: 5.8 THOU/MM3 (ref 1.8–7.7)
SODIUM BLD-SCNC: 135 MEQ/L (ref 135–145)
SPECIFIC GRAVITY UA: 1.02 (ref 1–1.03)
TOTAL PROTEIN: 6.8 G/DL (ref 6.1–8)
TROPONIN T: < 0.01 NG/ML
UROBILINOGEN, URINE: 0.2 EU/DL (ref 0.2–1)
WBC # BLD: 7.5 THOU/MM3 (ref 4.8–10.8)

## 2020-10-10 PROCEDURE — 74177 CT ABD & PELVIS W/CONTRAST: CPT

## 2020-10-10 PROCEDURE — 6360000002 HC RX W HCPCS: Performed by: EMERGENCY MEDICINE

## 2020-10-10 PROCEDURE — 99283 EMERGENCY DEPT VISIT LOW MDM: CPT

## 2020-10-10 PROCEDURE — 83690 ASSAY OF LIPASE: CPT

## 2020-10-10 PROCEDURE — 85025 COMPLETE CBC W/AUTO DIFF WBC: CPT

## 2020-10-10 PROCEDURE — 36415 COLL VENOUS BLD VENIPUNCTURE: CPT

## 2020-10-10 PROCEDURE — 99284 EMERGENCY DEPT VISIT MOD MDM: CPT

## 2020-10-10 PROCEDURE — 84484 ASSAY OF TROPONIN QUANT: CPT

## 2020-10-10 PROCEDURE — 80053 COMPREHEN METABOLIC PANEL: CPT

## 2020-10-10 PROCEDURE — 6370000000 HC RX 637 (ALT 250 FOR IP): Performed by: EMERGENCY MEDICINE

## 2020-10-10 PROCEDURE — 2580000003 HC RX 258: Performed by: EMERGENCY MEDICINE

## 2020-10-10 PROCEDURE — 2580000003 HC RX 258: Performed by: INTERNAL MEDICINE

## 2020-10-10 PROCEDURE — 1200000003 HC TELEMETRY R&B

## 2020-10-10 PROCEDURE — 81003 URINALYSIS AUTO W/O SCOPE: CPT

## 2020-10-10 PROCEDURE — 6360000004 HC RX CONTRAST MEDICATION: Performed by: EMERGENCY MEDICINE

## 2020-10-10 PROCEDURE — 82248 BILIRUBIN DIRECT: CPT

## 2020-10-10 PROCEDURE — 99215 OFFICE O/P EST HI 40 MIN: CPT

## 2020-10-10 PROCEDURE — 6360000002 HC RX W HCPCS: Performed by: INTERNAL MEDICINE

## 2020-10-10 RX ORDER — ONDANSETRON 2 MG/ML
4 INJECTION INTRAMUSCULAR; INTRAVENOUS ONCE
Status: COMPLETED | OUTPATIENT
Start: 2020-10-10 | End: 2020-10-10

## 2020-10-10 RX ORDER — FENTANYL CITRATE 50 UG/ML
50 INJECTION, SOLUTION INTRAMUSCULAR; INTRAVENOUS ONCE
Status: DISCONTINUED | OUTPATIENT
Start: 2020-10-10 | End: 2020-10-12 | Stop reason: HOSPADM

## 2020-10-10 RX ORDER — FENTANYL CITRATE 50 UG/ML
50 INJECTION, SOLUTION INTRAMUSCULAR; INTRAVENOUS ONCE
Status: COMPLETED | OUTPATIENT
Start: 2020-10-10 | End: 2020-10-10

## 2020-10-10 RX ORDER — SODIUM CHLORIDE, SODIUM LACTATE, POTASSIUM CHLORIDE, CALCIUM CHLORIDE 600; 310; 30; 20 MG/100ML; MG/100ML; MG/100ML; MG/100ML
1000 INJECTION, SOLUTION INTRAVENOUS ONCE
Status: COMPLETED | OUTPATIENT
Start: 2020-10-10 | End: 2020-10-10

## 2020-10-10 RX ORDER — SODIUM CHLORIDE, SODIUM LACTATE, POTASSIUM CHLORIDE, CALCIUM CHLORIDE 600; 310; 30; 20 MG/100ML; MG/100ML; MG/100ML; MG/100ML
INJECTION, SOLUTION INTRAVENOUS CONTINUOUS
Status: DISCONTINUED | OUTPATIENT
Start: 2020-10-10 | End: 2020-10-12 | Stop reason: HOSPADM

## 2020-10-10 RX ORDER — MORPHINE SULFATE 4 MG/ML
4 INJECTION, SOLUTION INTRAMUSCULAR; INTRAVENOUS ONCE
Status: DISCONTINUED | OUTPATIENT
Start: 2020-10-10 | End: 2020-10-12 | Stop reason: HOSPADM

## 2020-10-10 RX ORDER — ONDANSETRON 2 MG/ML
4 INJECTION INTRAMUSCULAR; INTRAVENOUS EVERY 6 HOURS PRN
Status: DISCONTINUED | OUTPATIENT
Start: 2020-10-10 | End: 2020-10-12 | Stop reason: HOSPADM

## 2020-10-10 RX ADMIN — FENTANYL CITRATE 50 MCG: 50 INJECTION, SOLUTION INTRAMUSCULAR; INTRAVENOUS at 17:16

## 2020-10-10 RX ADMIN — HYDROMORPHONE HYDROCHLORIDE 0.5 MG: 1 INJECTION, SOLUTION INTRAMUSCULAR; INTRAVENOUS; SUBCUTANEOUS at 23:58

## 2020-10-10 RX ADMIN — LIDOCAINE HYDROCHLORIDE: 20 SOLUTION ORAL; TOPICAL at 18:52

## 2020-10-10 RX ADMIN — SODIUM CHLORIDE, POTASSIUM CHLORIDE, SODIUM LACTATE AND CALCIUM CHLORIDE: 600; 310; 30; 20 INJECTION, SOLUTION INTRAVENOUS at 23:47

## 2020-10-10 RX ADMIN — SODIUM CHLORIDE, POTASSIUM CHLORIDE, SODIUM LACTATE AND CALCIUM CHLORIDE 1000 ML: 600; 310; 30; 20 INJECTION, SOLUTION INTRAVENOUS at 17:35

## 2020-10-10 RX ADMIN — IOPAMIDOL 80 ML: 755 INJECTION, SOLUTION INTRAVENOUS at 17:40

## 2020-10-10 RX ADMIN — ONDANSETRON 4 MG: 2 INJECTION INTRAMUSCULAR; INTRAVENOUS at 17:16

## 2020-10-10 ASSESSMENT — PAIN DESCRIPTION - PAIN TYPE
TYPE: ACUTE PAIN

## 2020-10-10 ASSESSMENT — PAIN SCALES - GENERAL
PAINLEVEL_OUTOF10: 10
PAINLEVEL_OUTOF10: 6
PAINLEVEL_OUTOF10: 5
PAINLEVEL_OUTOF10: 6
PAINLEVEL_OUTOF10: 10
PAINLEVEL_OUTOF10: 5

## 2020-10-10 ASSESSMENT — PAIN DESCRIPTION - DESCRIPTORS
DESCRIPTORS: ACHING

## 2020-10-10 ASSESSMENT — ENCOUNTER SYMPTOMS
ABDOMINAL PAIN: 1
WHEEZING: 0
RHINORRHEA: 0
EYE REDNESS: 0
DIARRHEA: 0
BACK PAIN: 1
NAUSEA: 0
NAUSEA: 1
EYE PAIN: 0
SORE THROAT: 0
SINUS PRESSURE: 0
SHORTNESS OF BREATH: 0
COUGH: 0
BACK PAIN: 0
CONSTIPATION: 0
BLOOD IN STOOL: 0
CHEST TIGHTNESS: 0
VOMITING: 0
TROUBLE SWALLOWING: 0
ABDOMINAL DISTENTION: 0

## 2020-10-10 ASSESSMENT — PAIN DESCRIPTION - LOCATION
LOCATION: ABDOMEN;BACK
LOCATION: ABDOMEN

## 2020-10-10 ASSESSMENT — PAIN DESCRIPTION - ORIENTATION
ORIENTATION: LOWER
ORIENTATION: LOWER
ORIENTATION: MID

## 2020-10-10 ASSESSMENT — PAIN DESCRIPTION - ONSET
ONSET: ON-GOING
ONSET: ON-GOING

## 2020-10-10 ASSESSMENT — PAIN DESCRIPTION - PROGRESSION
CLINICAL_PROGRESSION: GRADUALLY IMPROVING
CLINICAL_PROGRESSION: GRADUALLY IMPROVING

## 2020-10-10 ASSESSMENT — PAIN DESCRIPTION - FREQUENCY
FREQUENCY: CONTINUOUS
FREQUENCY: CONTINUOUS

## 2020-10-10 NOTE — ED NOTES
Pt complains of having mid abdominal and back pain that started this morning. Denies N/V/D,  Denies any urinary symptoms. States she did have a normal BM yesterday and a small one today. Pt state pain is 10/10. Denies increased gas or belching.       Yessica Harry RN  10/10/20 9389

## 2020-10-10 NOTE — ED PROVIDER NOTES
[place] ENCOUNTER          Pt Name: Iram Moser  MRN: [id number]  [place] 1960  Date of evaluation: 10/10/2020  Emergency Physician: Jose De Jesus Mccrary, [facility]       Chief Complaint   Patient presents with    Abdominal Pain    Back Pain     History obtained from the patient. HISTORY OF PRESENT ILLNESS    HPI  Iram Moser is a 62 y.o. female who presents to the emergency department for evaluation of abdominal pain, nausea, and vomiting(dry heave no emesis). Symptoms started at 5 AM this morning. Patient states the pain is in the epigastric area radiating into her back. It also goes across the entire upper part of her abdomen it is dull achy intermittent crampy, slightly better with Gas-X this afternoon however pain began to persist so she presented to urgent care. Worse with food. No ho GB problems. No ho pancreatitis or ETOH Use. Seen by garry GARNICA for colonscopy before but uncertain the provider. Does not follow with GI regularly. Seen today at urgent care and transferred her to the ED. Currently rates her pain as 10/10 and severe. No chest pain no shortness of breath no cough no congestion no fever no chills no blood in her stool no history of GI bleed. The patient has no other acute complaints at this time. REVIEW OF SYSTEMS   Review of Systems   Constitutional: Negative for activity change, chills and fever. HENT: Negative for congestion, rhinorrhea, sinus pressure and sore throat. Eyes: Negative for redness and visual disturbance. Respiratory: Negative for chest tightness, shortness of breath and wheezing. Cardiovascular: Negative for chest pain, palpitations and leg swelling. Gastrointestinal: Positive for abdominal pain and nausea. Negative for abdominal distention, constipation, diarrhea and vomiting. Endocrine: Negative for polydipsia and polyuria.    Genitourinary: Negative for decreased urine volume, dysuria and urgency. Musculoskeletal: Negative for back pain and myalgias. Skin: Negative for pallor and rash. Neurological: Negative for weakness, light-headedness and headaches. Hematological: Negative for adenopathy. Does not bruise/bleed easily. Psychiatric/Behavioral: Negative for self-injury and suicidal ideas. PAST MEDICAL AND SURGICAL HISTORY   History reviewed. No pertinent past medical history.   Past Surgical History:   Procedure Laterality Date    ADENOIDECTOMY      APPENDECTOMY      CATARACT REMOVAL Right 11/12/15    Dr Jared Gary SKIN CANCER EXCISION Right 11/11/15    shoulder, Dr Deo Ennis     Current Facility-Administered Medications:     lactated ringers infusion 1,000 mL, 1,000 mL, Intravenous, Once, Vergie Rinks, DO    aluminum & magnesium hydroxide-simethicone (MAALOX) 30 mL, lidocaine viscous hcl (XYLOCAINE) 5 mL (GI COCKTAIL), , Oral, Once, Vergie Rinks, DO    Current Outpatient Medications:     PARoxetine (PAXIL) 20 MG tablet, TAKE 1 TABLET BY MOUTH EVERY DAY IN THE MORNING, Disp: 90 tablet, Rfl: 1    fluticasone (FLONASE) 50 MCG/ACT nasal spray, 2 sprays by Nasal route daily (Patient not taking: Reported on 6/12/2020), Disp: 3 Bottle, Rfl: 0    vitamin D (ERGOCALCIFEROL) 86878 units CAPS capsule, 1 TABLET WEEKLY ORALLY 30 DAY(S), Disp: , Rfl: 2    NONFORMULARY, tumeric, Disp: , Rfl:     Collagen 500 MG CAPS, Take 1 Can by mouth daily, Disp: , Rfl:     folic acid (FOLVITE) 839 MCG tablet, Take 800 mcg by mouth daily, Disp: , Rfl:     Biotin 5000 MCG CAPS, Take 1 Can by mouth daily, Disp: , Rfl:     vitamin B-6 (PYRIDOXINE) 100 MG tablet, Take 150 mg by mouth daily, Disp: , Rfl:     Alpha-Lipoic Acid 100 MG CAPS, Take 1 Can by mouth daily, Disp: , Rfl:     fexofenadine (ALLEGRA ALLERGY) 180 MG tablet, Take 180 mg by mouth daily, Disp: , Rfl:     Omega-3 Fatty Acids (FISH OIL) 1000 MG CAPS, Take 3,000 mg by mouth daily, Disp: , Rfl:       SOCIAL HISTORY     Social History     Social History Narrative    Not on file     Social History     Tobacco Use    Smoking status: Never Smoker    Smokeless tobacco: Never Used   Substance Use Topics    Alcohol use: No    Drug use: No         ALLERGIES   No Known Allergies      FAMILY HISTORY     Family History   Problem Relation Age of Onset    Heart Disease Mother          PHYSICAL EXAM     ED Triage Vitals   BP Temp Temp Source Pulse Resp SpO2 Height Weight   10/10/20 1553 10/10/20 1553 10/10/20 1638 10/10/20 1553 10/10/20 1553 10/10/20 1553 10/10/20 1553 10/10/20 1553   130/74 97.7 °F (36.5 °C) Oral 87 16 98 % 5' 3\" (1.6 m) 140 lb (63.5 kg)         Additional Vital Signs:  Vitals:    10/10/20 1638   BP: 116/78   Pulse: 77   Resp: 16   Temp: 98.9 °F (37.2 °C)   SpO2: 100%       Physical Exam  Constitutional:       General: She is not in acute distress. Appearance: She is well-developed. She is not diaphoretic. HENT:      Head: Normocephalic and atraumatic. Eyes:      General:         Right eye: No discharge. Left eye: No discharge. Conjunctiva/sclera: Conjunctivae normal.      Pupils: Pupils are equal, round, and reactive to light. Neck:      Musculoskeletal: Normal range of motion and neck supple. Thyroid: No thyromegaly. Vascular: No JVD. Cardiovascular:      Rate and Rhythm: Normal rate and regular rhythm. Heart sounds: Normal heart sounds. Pulmonary:      Effort: Pulmonary effort is normal. No respiratory distress. Breath sounds: Normal breath sounds. Abdominal:      General: Bowel sounds are normal. There is no distension. Palpations: Abdomen is soft. Tenderness: There is abdominal tenderness in the epigastric area. There is no right CVA tenderness, left CVA tenderness, guarding or rebound.    Musculoskeletal: Normal range of motion. General: No tenderness. Lymphadenopathy:      Cervical: No cervical adenopathy. Skin:     General: Skin is warm and dry. Capillary Refill: Capillary refill takes less than 2 seconds. Findings: No rash. Neurological:      Mental Status: She is alert and oriented to person, place, and time. She is not disoriented. GCS: GCS eye subscore is 4. GCS verbal subscore is 5. GCS motor subscore is 6. Cranial Nerves: No cranial nerve deficit. Sensory: No sensory deficit. Motor: No abnormal muscle tone or seizure activity. Coordination: Coordination normal.         Initial vital signs and nursing assessment reviewed and normal. Pulsoximetry is normal per my interpretation. MEDICAL DECISION MAKING   Initial Assessment: Patient presented for evaluation of epigastric abdominal pain. The vitals signs and physical exam were notable for tenderness to the epigastrium she is afebrile and her vital signs are stable. Given these findings the emergent condition that needed addressed/further defined were pancreatic [place] cholecystitis cholelithiasis, small bowel obstruction, constipation, peptic ulcer disease, dyspepsia. Given the patient afebrile vital signs are stable EKG is normal. I thought the risk of ACS dissection was low. Plan: To further define disposition and risk stratification will obtain CBC, BMP, ECG, lipase, LFTs, pain control, nausea medicine, IV hydration.         ED RESULTS   Laboratory results:  Labs Reviewed   URINALYSIS - Abnormal; Notable for the following components:       Result Value    Leukocyte Esterase, Urine Small (*)     All other components within normal limits   OSMOLALITY - Abnormal; Notable for the following components:    Osmolality Calc 271.8 (*)     All other components within normal limits   CBC WITH AUTO DIFFERENTIAL   BASIC METABOLIC PANEL   HEPATIC FUNCTION PANEL   LIPASE   TROPONIN   ANION GAP   GLOMERULAR FILTRATION RATE, ESTIMATED CBC WITH AUTO DIFFERENTIAL   COMPREHENSIVE METABOLIC PANEL   LIPID PANEL   LACTIC ACID, PLASMA       Radiologic studies results:  CT ABDOMEN PELVIS W IV CONTRAST Additional Contrast? None   Final Result      Extensive retroperitoneal edema or fibrosis. There is trace free fluid in the pelvis. There is bilateral pelvocaliectasis which would support the diagnosis of retroperitoneal fibrosis. No other acute findings. **This report has been created using voice recognition software. It may contain minor errors which are inherent in voice recognition technology. **      Final report electronically signed by Dr. Brittany Lewis on 10/10/2020 6:04 PM          ED Medications administered this visit:   Medications   lactated ringers infusion 1,000 mL (has no administration in time range)   aluminum & magnesium hydroxide-simethicone (MAALOX) 30 mL, lidocaine viscous hcl (XYLOCAINE) 5 mL (GI COCKTAIL) (has no administration in time range)   fentaNYL (SUBLIMAZE) injection 50 mcg (50 mcg Intravenous Given 10/10/20 1716)   ondansetron (ZOFRAN) injection 4 mg (4 mg Intravenous Given 10/10/20 1716)         ED COURSE     ED Course as of Oct 11 0545   Sun Oct 11, 2020   8232 Patient symptoms and exam ? Pancreatitis. Lipase is 16. LFT Normal.     [DD]   0539 Lipase: 16.1 [DD]   0540 Hepatic Function Panel:    Albumin 4.4   Bilirubin 0.7   Bilirubin, Direct <0.2   Alk Phos 94   AST 21   ALT 15   Total Protein 6.8 [DD]   0540 Troponin:    Troponin T < 0.010 [DD]   0540 Urinalysis(!):    Glucose, UA Negative   Bilirubin, Urine Negative   Ketones, Urine 15   Specific Gravity, UA 1.025   Blood, Urine Trace-lysed   pH, UA 7.00   Protein, UA Negative   Urobilinogen, Urine 0.20   Nitrite, Urine Negative   Leukocyte Esterase, Urine Small(!)   Color, UA Yellow   Character, Urine Clear [DD]   0540 diffuse hypodense material throughout the retroperitoneum this surrounds the aorta cava within Morison's pouch. Along the aorta inferiorly. There are a few prominent lymph nodes noted associated with this. There is mild pelviectasis bilaterally. Uncertain etiology as patient pain requiring multiple dose of medications to control and onset today. CT ABDOMEN PELVIS W IV CONTRAST Additional Contrast? None [DD]   7003 Case discussed with Dr. Akil Gardner, Patient accepted for admission. GI consultant Dr. Enrique Floyd notified of consultant. No recs at this time. [DD]      ED Course User Index  [DD] Libra Ellison DO       .  MEDICATION CHANGES     DISCHARGE MEDICATIONS:  New Prescriptions    No medications on file            FINAL DISPOSITION     Final diagnoses:   Abdominal pain, epigastric   Intractable epigastric abdominal pain     Condition: condition: good  Dispo: Admit to med/surg floor    PATIENT REFERRED TO:  No follow-up provider specified. This transcription was electronically signed. Parts of this transcriptions may have been dictated by use of voice recognition software and electronically transcribed, and parts may have been transcribed with the assistance of an ED scribe. The transcription may contain errors not detected in proofreading.     Electronically Signed: Libra Ellison, 10/10/20, 5:25 PM     Libra Ellison DO  10/11/20 9710

## 2020-10-10 NOTE — ED TRIAGE NOTES
Pt to ED room 8 from private car - transfer from [facility] for low back and abdominal pain that started at 5 this morning. Denies nausea, vomiting, diarrhea. Last normal bowel movement was yesterday and had a small amount of bowel today. Reports that her appetite is decreased today. No burning, pain or frequency of urination.

## 2020-10-10 NOTE — ED NOTES
Reassessment of the patients Abdominal Pain and Back Pain   has slightly improved, the patients pain reassessment is a 5/10, Side rails up times 2, call light in reach, will continue to monitor.        Lissette Zamora RN  10/10/20 2369

## 2020-10-10 NOTE — ED NOTES
Bedside report received from [facility]. Pt resting on cot, VSS. Pt reports pain has improved since being medicated. Call light in reach, will continue to monitor.      Shonna Rai RN  10/10/20 8848

## 2020-10-10 NOTE — ED PROVIDER NOTES
[address] [facility] [place] [address] [address] EMERGENCY DEPT  Urgent Care Encounter       CHIEF COMPLAINT       Chief Complaint   Patient presents with    Abdominal Pain    Back Pain       Nurses Notes reviewed and I agree except as noted in the HPI. HISTORY OF PRESENT ILLNESS   Florecita Maloney is a 62 y.o. female with a past medical history of depression who presents today with abdominal pain which started suddenly this morning at 5 AM.  Patient describes her pain as 10 out of 10, aching, radiates to the back. She reports the only therapy she has tried to manage her symptoms as Gas-X which she took at around noon today. She states she has not been able to sleep or rest easily since symptoms started. She states she has never had any abdominal surgeries, still has her gallbladder, quit smoking about 40 years ago, denies alcohol use. She reports her last bowel movements were yesterday and this morning. She otherwise denies fever, chills, nausea, vomiting, cough, chest pain, swelling in her legs, dysuria, hematuria, blood in stool. REVIEW OF SYSTEMS     Review of Systems   Constitutional: Negative for chills, fatigue and fever. HENT: Negative for ear pain, postnasal drip and trouble swallowing. Eyes: Negative for pain and visual disturbance. Respiratory: Negative for cough and shortness of breath. Cardiovascular: Negative for chest pain and palpitations. Gastrointestinal: Positive for abdominal pain (diffuse, severe). Negative for blood in stool, constipation, diarrhea, nausea and vomiting. Genitourinary: Negative for dysuria, hematuria, menstrual problem, pelvic pain, vaginal bleeding and vaginal discharge. Musculoskeletal: Positive for back pain (from abdominal pain). Skin: Negative for rash and wound. Neurological: Negative for light-headedness and headaches. PAST MEDICAL HISTORY   History reviewed. No pertinent past medical history.     SURGICALHISTORY     Patient  has a past surgical history that includes Tonsillectomy; Adenoidectomy; Appendectomy; Dilation and curettage of uterus; Myringotomy Tympanostomy Tube Placement; Cataract removal (Right, 11/12/15); and Skin cancer excision (Right, 11/11/15). CURRENT MEDICATIONS       Previous Medications    ALPHA-LIPOIC ACID 100 MG CAPS    Take 1 Can by mouth daily    BIOTIN 5000 MCG CAPS    Take 1 Can by mouth daily    COLLAGEN 500 MG CAPS    Take 1 Can by mouth daily    FEXOFENADINE (ALLEGRA ALLERGY) 180 MG TABLET    Take 180 mg by mouth daily    FLUTICASONE (FLONASE) 50 MCG/ACT NASAL SPRAY    2 sprays by Nasal route daily    FOLIC ACID (FOLVITE) 465 MCG TABLET    Take 800 mcg by mouth daily    NONFORMULARY    tumeric    OMEGA-3 FATTY ACIDS (FISH OIL) 1000 MG CAPS    Take 3,000 mg by mouth daily    PAROXETINE (PAXIL) 20 MG TABLET    TAKE 1 TABLET BY MOUTH EVERY DAY IN THE MORNING    VITAMIN B-6 (PYRIDOXINE) 100 MG TABLET    Take 150 mg by mouth daily    VITAMIN D (ERGOCALCIFEROL) 73808 UNITS CAPS CAPSULE    1 TABLET WEEKLY ORALLY 30 DAY(S)       ALLERGIES     Patient is has No Known Allergies. Patients   Immunization History   Administered Date(s) Administered    Tdap (Boostrix, Adacel) 12/07/2016       FAMILY HISTORY     Patient's family history includes Heart Disease in her mother. SOCIAL HISTORY     Patient  reports that she has never smoked. She has never used smokeless tobacco. She reports that she does not drink alcohol or use drugs. PHYSICAL EXAM     ED TRIAGE VITALS  BP: 130/74, Temp: 97.7 °F (36.5 °C), Pulse: 87, Resp: 16, SpO2: 98 %,Estimated body mass index is 24.8 kg/m² as calculated from the following:    Height as of this encounter: 5' 3\" (1.6 m). Weight as of this encounter: 140 lb (63.5 kg). ,No LMP recorded. Patient is premenopausal.    Physical Exam  Vitals signs and nursing note reviewed. Constitutional:       General: She is in acute distress. Appearance: She is well-developed. She is ill-appearing. She is not diaphoretic.    HENT: Head: Normocephalic and atraumatic. Right Ear: External ear normal.      Left Ear: External ear normal.      Nose: Nose normal.   Eyes:      General: No scleral icterus. Right eye: No discharge. Left eye: No discharge. Conjunctiva/sclera: Conjunctivae normal.   Neck:      Musculoskeletal: Normal range of motion. Cardiovascular:      Rate and Rhythm: Normal rate and regular rhythm. Heart sounds: Normal heart sounds. No murmur. Pulmonary:      Effort: Pulmonary effort is normal. No respiratory distress. Breath sounds: Normal breath sounds. No wheezing. Abdominal:      Tenderness: There is abdominal tenderness in the epigastric area. There is no right CVA tenderness or left CVA tenderness. Hernia: No hernia is present. Skin:     General: Skin is warm and dry. Findings: No erythema or rash. Neurological:      Mental Status: She is alert and oriented to person, place, and time. Cranial Nerves: No cranial nerve deficit. Psychiatric:         Behavior: Behavior normal.         Thought Content:  Thought content normal.         Judgment: Judgment normal.         DIAGNOSTIC RESULTS     Labs:  Results for orders placed or performed during the hospital encounter of 10/10/20   Urinalysis   Result Value Ref Range    Glucose, Ur Negative NEGATIVE mg/dl    Bilirubin Urine Negative NEGATIVE    Ketones, Urine 15 NEGATIVE    Specific Gravity, UA 1.025 1.002 - 1.030    Blood, Urine Trace-lysed NEGATIVE    pH, UA 7.00 5.0 - 9.0    Protein, UA Negative NEGATIVE mg/dl    Urobilinogen, Urine 0.20 0.2 - 1.0 eu/dl    Nitrite, Urine Negative NEGATIVE    Leukocyte Esterase, Urine Small (A) NEGATIVE    Color, UA Yellow STRAW-YELLOW    Character, Urine Clear CLEAR-SL CLOUD       IMAGING:    No orders to display         EKG: none      URGENT CARE COURSE:     Vitals:    10/10/20 1553   BP: 130/74   Pulse: 87   Resp: 16   Temp: 97.7 °F (36.5 °C)   SpO2: 98%   Weight: 140 lb (63.5 kg) Height: 5' 3\" (1.6 m)       Medications - No data to display         PROCEDURES:  None    FINAL IMPRESSION      1. Abdominal pain, epigastric          DISPOSITION/ PLAN     This is a 50-year-old female with past medical history of depression who presents complaining of abdominal pain which began suddenly at 5 AM today. Pain is described as 10 of 10 with radiation to back. Although patient complains of diffuse abdominal pain, her exam is consistent with tenderness that is most severe in the epigastric region. Overall clinical picture is most consistent with acute pancreatitis versus gastritis. Vital signs are stable. At this time she is transferred to [facility] emergency department per her request.  Her spouse will drive her there. Report given to and patient transfer accepted by Josep Foster, charge nurse, at 4519. Patient is advised to remain NPO.       PATIENT REFERRED TO:  Stacey Hood MD  [address] [place] [id number]      DISCHARGE MEDICATIONS:  New Prescriptions    No medications on file       Discontinued Medications    No medications on file       Current Discharge Medication List          Art Lopez MD    (Please note that portions of this note were completed with a voice recognition program. Efforts were made to edit the dictations but occasionally words are mis-transcribed.)           Art Lopez MD  Resident  10/10/20 9183       Art Lopez MD  Resident  10/10/20 2322

## 2020-10-11 ENCOUNTER — APPOINTMENT (OUTPATIENT)
Dept: MRI IMAGING | Age: 60
DRG: 391 | End: 2020-10-11
Payer: COMMERCIAL

## 2020-10-11 LAB
ALBUMIN SERPL-MCNC: 3.5 G/DL (ref 3.5–5.1)
ALP BLD-CCNC: 70 U/L (ref 38–126)
ALT SERPL-CCNC: 11 U/L (ref 11–66)
ANION GAP SERPL CALCULATED.3IONS-SCNC: 10 MEQ/L (ref 8–16)
AST SERPL-CCNC: 15 U/L (ref 5–40)
BASOPHILS # BLD: 0.7 %
BASOPHILS ABSOLUTE: 0 THOU/MM3 (ref 0–0.1)
BILIRUB SERPL-MCNC: 0.7 MG/DL (ref 0.3–1.2)
BUN BLDV-MCNC: 13 MG/DL (ref 7–22)
C-REACTIVE PROTEIN: 1.1 MG/DL (ref 0–1)
CALCIUM SERPL-MCNC: 8.8 MG/DL (ref 8.5–10.5)
CHLORIDE BLD-SCNC: 105 MEQ/L (ref 98–111)
CHOLESTEROL, TOTAL: 192 MG/DL (ref 100–199)
CO2: 25 MEQ/L (ref 23–33)
CREAT SERPL-MCNC: 0.6 MG/DL (ref 0.4–1.2)
EOSINOPHIL # BLD: 0.9 %
EOSINOPHILS ABSOLUTE: 0 THOU/MM3 (ref 0–0.4)
ERYTHROCYTE [DISTWIDTH] IN BLOOD BY AUTOMATED COUNT: 12.4 % (ref 11.5–14.5)
ERYTHROCYTE [DISTWIDTH] IN BLOOD BY AUTOMATED COUNT: 45.1 FL (ref 35–45)
GFR SERPL CREATININE-BSD FRML MDRD: > 90 ML/MIN/1.73M2
GLUCOSE BLD-MCNC: 87 MG/DL (ref 70–108)
HCT VFR BLD CALC: 41.2 % (ref 37–47)
HDLC SERPL-MCNC: 66 MG/DL
HEMOGLOBIN: 13.5 GM/DL (ref 12–16)
IMMATURE GRANS (ABS): 0.01 THOU/MM3 (ref 0–0.07)
IMMATURE GRANULOCYTES: 0.2 %
LACTIC ACID: 0.6 MMOL/L (ref 0.5–2.2)
LDL CHOLESTEROL CALCULATED: 112 MG/DL
LYMPHOCYTES # BLD: 20.7 %
LYMPHOCYTES ABSOLUTE: 1 THOU/MM3 (ref 1–4.8)
MCH RBC QN AUTO: 32.5 PG (ref 26–33)
MCHC RBC AUTO-ENTMCNC: 32.8 GM/DL (ref 32.2–35.5)
MCV RBC AUTO: 99 FL (ref 81–99)
MONOCYTES # BLD: 8.5 %
MONOCYTES ABSOLUTE: 0.4 THOU/MM3 (ref 0.4–1.3)
NUCLEATED RED BLOOD CELLS: 0 /100 WBC
OSMOLALITY CALCULATION: 278.9 MOSMOL/KG (ref 275–300)
PLATELET # BLD: 145 THOU/MM3 (ref 130–400)
PMV BLD AUTO: 11.4 FL (ref 9.4–12.4)
POTASSIUM SERPL-SCNC: 4.1 MEQ/L (ref 3.5–5.2)
RBC # BLD: 4.16 MILL/MM3 (ref 4.2–5.4)
SEDIMENTATION RATE, ERYTHROCYTE: 3 MM/HR (ref 0–20)
SEG NEUTROPHILS: 69 %
SEGMENTED NEUTROPHILS ABSOLUTE COUNT: 3.2 THOU/MM3 (ref 1.8–7.7)
SODIUM BLD-SCNC: 140 MEQ/L (ref 135–145)
TOTAL PROTEIN: 5.5 G/DL (ref 6.1–8)
TRIGL SERPL-MCNC: 69 MG/DL (ref 0–199)
TSH SERPL DL<=0.05 MIU/L-ACNC: 1.01 UIU/ML (ref 0.4–4.2)
WBC # BLD: 4.6 THOU/MM3 (ref 4.8–10.8)

## 2020-10-11 PROCEDURE — 2580000003 HC RX 258: Performed by: INTERNAL MEDICINE

## 2020-10-11 PROCEDURE — 80053 COMPREHEN METABOLIC PANEL: CPT

## 2020-10-11 PROCEDURE — 86140 C-REACTIVE PROTEIN: CPT

## 2020-10-11 PROCEDURE — 84443 ASSAY THYROID STIM HORMONE: CPT

## 2020-10-11 PROCEDURE — 1200000003 HC TELEMETRY R&B

## 2020-10-11 PROCEDURE — 85651 RBC SED RATE NONAUTOMATED: CPT

## 2020-10-11 PROCEDURE — 86038 ANTINUCLEAR ANTIBODIES: CPT

## 2020-10-11 PROCEDURE — 6370000000 HC RX 637 (ALT 250 FOR IP): Performed by: NURSE PRACTITIONER

## 2020-10-11 PROCEDURE — 36415 COLL VENOUS BLD VENIPUNCTURE: CPT

## 2020-10-11 PROCEDURE — 85025 COMPLETE CBC W/AUTO DIFF WBC: CPT

## 2020-10-11 PROCEDURE — 82784 ASSAY IGA/IGD/IGG/IGM EACH: CPT

## 2020-10-11 PROCEDURE — 83605 ASSAY OF LACTIC ACID: CPT

## 2020-10-11 PROCEDURE — 80061 LIPID PANEL: CPT

## 2020-10-11 PROCEDURE — 6370000000 HC RX 637 (ALT 250 FOR IP): Performed by: INTERNAL MEDICINE

## 2020-10-11 RX ORDER — HYDROCODONE BITARTRATE AND ACETAMINOPHEN 5; 325 MG/1; MG/1
1 TABLET ORAL EVERY 6 HOURS PRN
Status: DISCONTINUED | OUTPATIENT
Start: 2020-10-11 | End: 2020-10-12 | Stop reason: HOSPADM

## 2020-10-11 RX ORDER — COCONUT OIL
OIL (ML) MISCELLANEOUS
Status: ON HOLD | COMMUNITY
End: 2020-10-12 | Stop reason: HOSPADM

## 2020-10-11 RX ORDER — ACETAMINOPHEN 325 MG/1
650 TABLET ORAL EVERY 6 HOURS PRN
Status: DISCONTINUED | OUTPATIENT
Start: 2020-10-11 | End: 2020-10-12 | Stop reason: HOSPADM

## 2020-10-11 RX ORDER — LORAZEPAM 1 MG/1
1 TABLET ORAL ONCE
Status: COMPLETED | OUTPATIENT
Start: 2020-10-12 | End: 2020-10-12

## 2020-10-11 RX ORDER — PAROXETINE HYDROCHLORIDE 20 MG/1
20 TABLET, FILM COATED ORAL DAILY
Status: DISCONTINUED | OUTPATIENT
Start: 2020-10-11 | End: 2020-10-12 | Stop reason: HOSPADM

## 2020-10-11 RX ORDER — PANTOPRAZOLE SODIUM 40 MG/1
40 TABLET, DELAYED RELEASE ORAL
Status: DISCONTINUED | OUTPATIENT
Start: 2020-10-11 | End: 2020-10-12 | Stop reason: HOSPADM

## 2020-10-11 RX ORDER — PREDNISONE 20 MG/1
40 TABLET ORAL DAILY
Status: DISCONTINUED | OUTPATIENT
Start: 2020-10-11 | End: 2020-10-12 | Stop reason: HOSPADM

## 2020-10-11 RX ORDER — FLUTICASONE PROPIONATE 50 MCG
2 SPRAY, SUSPENSION (ML) NASAL DAILY
Status: DISCONTINUED | OUTPATIENT
Start: 2020-10-11 | End: 2020-10-12 | Stop reason: HOSPADM

## 2020-10-11 RX ADMIN — FLUTICASONE PROPIONATE 2 SPRAY: 50 SPRAY, METERED NASAL at 11:11

## 2020-10-11 RX ADMIN — PAROXETINE HYDROCHLORIDE 20 MG: 20 TABLET, FILM COATED ORAL at 11:11

## 2020-10-11 RX ADMIN — PANTOPRAZOLE SODIUM 40 MG: 40 TABLET, DELAYED RELEASE ORAL at 12:22

## 2020-10-11 RX ADMIN — SODIUM CHLORIDE, POTASSIUM CHLORIDE, SODIUM LACTATE AND CALCIUM CHLORIDE: 600; 310; 30; 20 INJECTION, SOLUTION INTRAVENOUS at 10:08

## 2020-10-11 RX ADMIN — SODIUM CHLORIDE, POTASSIUM CHLORIDE, SODIUM LACTATE AND CALCIUM CHLORIDE: 600; 310; 30; 20 INJECTION, SOLUTION INTRAVENOUS at 20:21

## 2020-10-11 RX ADMIN — ACETAMINOPHEN 650 MG: 325 TABLET ORAL at 08:10

## 2020-10-11 RX ADMIN — PREDNISONE 40 MG: 20 TABLET ORAL at 13:34

## 2020-10-11 ASSESSMENT — PAIN DESCRIPTION - DESCRIPTORS
DESCRIPTORS: ACHING

## 2020-10-11 ASSESSMENT — PAIN DESCRIPTION - ORIENTATION
ORIENTATION: MID

## 2020-10-11 ASSESSMENT — PAIN DESCRIPTION - PROGRESSION

## 2020-10-11 ASSESSMENT — PAIN SCALES - GENERAL
PAINLEVEL_OUTOF10: 5
PAINLEVEL_OUTOF10: 4
PAINLEVEL_OUTOF10: 3
PAINLEVEL_OUTOF10: 4
PAINLEVEL_OUTOF10: 2
PAINLEVEL_OUTOF10: 3

## 2020-10-11 ASSESSMENT — PAIN DESCRIPTION - PAIN TYPE
TYPE: ACUTE PAIN

## 2020-10-11 ASSESSMENT — PAIN - FUNCTIONAL ASSESSMENT: PAIN_FUNCTIONAL_ASSESSMENT: ACTIVITIES ARE NOT PREVENTED

## 2020-10-11 ASSESSMENT — PAIN DESCRIPTION - FREQUENCY
FREQUENCY: CONTINUOUS

## 2020-10-11 ASSESSMENT — PAIN DESCRIPTION - LOCATION
LOCATION: ABDOMEN

## 2020-10-11 ASSESSMENT — PAIN DESCRIPTION - ONSET
ONSET: ON-GOING

## 2020-10-11 NOTE — PROGRESS NOTES
Spoke with Dr. Leslie Lee about patient not being able to tolerate MRCP d/t being claustrophobi. Stated she would place an order for one time dose of ativan. Discussed this with the patient.

## 2020-10-11 NOTE — H&P
No  previous blood clots or bleeding problems. No rheumatological problems. PAST SURGICAL HISTORY:  Had tonsillectomy, skin cancer excised,  tympanoplasty with tubes placed, D and C, cataract surgery,  appendectomy, and adenoidectomy. ALLERGIES:  No known drug allergies. HOME MEDICATIONS:  List had coconut oil, Paxil, Flonase, collagen, folic  acid, biotin, pyridoxine, and fish oil. SOCIAL HISTORY:  She denies any smoking, alcohol, illicit drug use. REVIEW OF OTHER SYSTEMS:  Pain in the abdomen was just present since  yesterday. No nausea or vomiting. No diarrhea or constipation. No  hematuria or hematochezia. No skin rash. No chest pain. No  palpitations. No Raynaud's phenomenon. PHYSICAL EXAMINATION:  VITAL SIGNS:  Blood pressure was 107/62, pulse was 70, respirations  _____, temperature of 98.3. HEENT:  Pupils are reacting to light. Tongue is moist.  Buccal mucosa  was moist.  NECK:  Supple. No bruit. HEART:  S1, S2 heard. Not tachycardiac. LUNGS:  Air exchange is heard bilaterally. ABDOMEN:  Soft. No tenderness appreciated at the time of examination. No rebound. Bowel sounds are heard. EXTREMITIES:  Warm. No ankle edema. NEUROLOGIC:  She is oriented and following instructions. LABORATORY DATA:  On admission, WBC was 7.5, hemoglobin 15.3, hematocrit  of 45.9, platelets of 544. Sodium was 135, potassium of 3.7, chloride  of 100, CO2 is 25, BUN of 16, creatinine of 0.6. Lactic acid was 0.6. ALT was 15, AST 21. Lipase was 16.1. IMPRESSION:  1. Abdominal pain with CT suggestive of retroperitoneal edema versus  fibrosis. 2.  History of anxiety. RECOMMENDATIONS:  1.  GI has already been consulted and considering possible autoimmune  pancreatitis. Connective tissue workup has already been ordered and she  is continued on GI prophylaxis, DVT prophylaxis, SCDs. The patient was  claustrophobic to have MRI done.   Encourage her to have it done within  an antianxiety Ativan dose given prior to the procedure. The patient  will think about it.   2.  Support of care with IV fluids, pain control        Levar Carnes M.D.    D: 10/11/2020 11:49:30       T: 10/11/2020 13:28:45     [email or web address]  Job#: [id number]     Doc#: [id number]    CC:

## 2020-10-11 NOTE — PROGRESS NOTES
Pt admitted to  6 from home. Complaints: abdominal pain. IV lactated Ringer's infusing into the antecubital left, condition patent and no redness at a rate of 100 mls/ hour with about 900 mls in the bag still. IV site free of s/s of infection or infiltration. Vital signs obtained. Assessment and data collection initiated. Two nurse skin assessment performed by Chika Blackwell RN and Davonte Madden. Oriented to room. Policies and procedures for  explained. Angelique HENNING discussed hourly rounding with patient addressing 5 P's. Fall prevention and safety brochure discussed with patient. Bed alarm on. Call light in reach. The best day to schedule a follow up Dr appointment is:  Monday/Friday a.m. Explained patients right to have family, representative or physician notified of their admission. Patient has declined for physician to be notified. Patient has Declined for family/representative to be notified. All questions answered with no further questions at this time.

## 2020-10-11 NOTE — ED NOTES
Pt refusing pain medication at this time, reporting that pain level is tolerable      Mela Cheadle, MILADY  10/10/20 2030

## 2020-10-11 NOTE — PLAN OF CARE
Problem: Pain:  Goal: Pain level will decrease  Description: Pain level will decrease  Outcome: Ongoing  Note: Pt verbalizes abdominal pain and a headache this shift. PRN tylenol and dilaudid on MAR. Problem: Discharge Planning:  Goal: Discharged to appropriate level of care  Description: Discharged to appropriate level of care  Outcome: Ongoing  Note: Pt plans on returning home when medically stable. Problem: Falls - Risk of:  Goal: Will remain free from falls  Description: Will remain free from falls  Outcome: Ongoing  Note: No falls this shift. Patient educated on not getting up without help. Falling star protocol in place. Call light in reach. Bed in lowest position. Side rails up x2. Bed alarm set. Patient visually checked on hourly rounds. Care plan reviewed with patient. Patient verbalizes understanding of the plan of care and contribute to goal setting.

## 2020-10-11 NOTE — PROGRESS NOTES
An mrcp was attempted for Ana Laura @1048. Rodger Alarcon is very  claustrophobic and unable to do her exam. She was attempted x 2 to go into scanner. She had her mask over her eyes and still unable to do exam. I told her that sometimes some sedation can be ordered prior to her exam and she didn't know if she wanted to try that. Bhaskar Loaiza, was notified per phone @8972.

## 2020-10-11 NOTE — ED NOTES
ED to inpatient nurses report    Chief Complaint   Patient presents with    Abdominal Pain    Back Pain      Present to ED from home  LOC: alert and orientated to name, place, date  Vital signs   Vitals:    10/10/20 1850 10/10/20 1859 10/10/20 2022 10/10/20 2128   BP: 133/71 134/80 115/74    Pulse:  70  81   Resp: 18 18 18 15   Temp:       TempSrc:       SpO2: 99% 98% 94% 96%   Weight:       Height:          Oxygen Baseline room air    Current needs required room air  Bipap/Cpap No  LDAs:   Peripheral IV 10/10/20 Left Antecubital (Active)   Site Assessment Clean;Dry; Intact 10/10/20 1859   Line Status Normal saline locked 10/10/20 1859   Dressing Status Clean;Dry; Intact 10/10/20 1859   Dressing Intervention New 10/10/20 1644     Mobility: Independent  Pending ED orders: None  Present condition: Pt resting on cot.  Pt in stable conditon     Electronically signed by Darrion Rodriguez RN on 10/10/2020 at 11:31 PM     Jaimie Brush RN  10/10/20 3635

## 2020-10-11 NOTE — CONSULTS
Consult History & Physical      Patient:  Yanira Buckner  YOB: 1960  MRN: [id number]     Acct: [de-identified]    Chief Complaint:    Chief Complaint   Patient presents with    Abdominal Pain    Back Pain       Date of Service: Pt seen/examined in consultation on 10/11/2020    History Of Present Illness:      62 y.o. female who we are asked to see/evaluate by Machelle Andrews MD for medical management of abdominal pain. Patient presented to ED 10/10/20 complaining of  Abdominal pain, nausea, vomiting. Symptoms onset that morning. Pain in epigastrium radiating to the back. Gas-X provided temporary improvement earlier that day. No hx of gallbladder disease, pancreatitis, gastritis, PUD. Denied EtOH use. Rated pain 10/10 in ED. CT demonstrated extensive retroperitoneal edema or fibrosis, trace free fluid in the pelvis, bilateral pelviectasis, suspicious for retroperitoneal fibrosis. Lipase and troponin normal, GFR normal, UA small leukocytes otherwise normal, CBC normal, BMP normal, HFP normal.   Today on assessment resting in bed. Describes pain as a full dull aching sensation in the upper abdomen/epigastric area that radiates towards her back. Never experienced this type of pain before. Denies NSAID use. Pain onset was abrupt yesterday and not alleviated by position. Denies unintended wt loss, f/c, n/v, change in BH, diarrhea, constipation, melena, hematochezia prior to admission. Appetite reduced yesterday. Last BM 10/9/20. Past Medical History:    History reviewed. No pertinent past medical history. Home Medications:  Prior to Admission medications    Medication Sig Start Date End Date Taking?  Authorizing Provider   PHOSPHATIDYLSERINE PO Take by mouth   Yes Historical Provider, MD   Coconut Oil OIL by Does not apply route   Yes Historical Provider, MD   PARoxetine (PAXIL) 20 MG tablet TAKE 1 TABLET BY MOUTH EVERY DAY IN THE MORNING 6/12/20  Yes Chelita Lynn MD   fluticasone [facility]) 50 MCG/ACT nasal spray 2 sprays by Nasal route daily 10/16/19  Yes Shine Segura MD   NONFORMULARY tumeric   Yes Historical Provider, MD   Collagen 500 MG CAPS Take 1 Can by mouth daily   Yes Historical Provider, MD   folic acid (FOLVITE) 501 MCG tablet Take 800 mcg by mouth daily   Yes Historical Provider, MD   Biotin 5000 MCG CAPS Take 1 Can by mouth daily   Yes Historical Provider, MD   vitamin B-6 (PYRIDOXINE) 100 MG tablet Take 150 mg by mouth daily   Yes Historical Provider, MD   Alpha-Lipoic Acid 100 MG CAPS Take 1 Can by mouth daily   Yes Historical Provider, MD   Omega-3 Fatty Acids (FISH OIL) 1000 MG CAPS Take 3,000 mg by mouth daily   Yes Historical Provider, MD       Surgical History:  Past Surgical History:   Procedure Laterality Date    ADENOIDECTOMY      APPENDECTOMY      CATARACT REMOVAL Right 11/12/15    Dr Nereyda Matos [address] Right 11/11/15    shoulder, Dr Ehsan Schilling         Family History:  Family History   Problem Relation Age of Onset    Heart Disease Mother      Family hx of GI cancer: None    Past GI History:  Colonoscopy 3/14/17 with Dr. Onelia Monte, screening, multiple (6) tubular adenomatous polyps removed, mild to mod hemorrhoids. Repeat planned in 2020. Allergies:  Patient has no known allergies. Social History:   TOBACCO:   reports that she has never smoked. She has never used smokeless tobacco.  ETOH:   reports no history of alcohol use. Review Of Systems  GENERAL: No fever, chills or weight loss. EYES:  No  blurred vision, double vision   CARDIOVASCULAR: No chest pain or palpitations. RESPIRATORY:  No dyspnea or cough. GI:  See HPI  MUSCULOSKELETAL: No new painful or swollen joints or myalgias. :   No dysuria or hematuria. SKIN:  No rashes or jaundice. NEUROLOGIC:  No headaches or seizures, numbness or tingling of arms, or legs. PSYCH:  No anxiety or depression. ENDOCRINE:  No polyuria or polydipsia. BLOOD:  No anemia, bleeding disorder, blood or blood product transfusion. PHYSICAL EXAM:  /61   Pulse 71   Temp 98.6 °F (37 °C) (Oral)   Resp 18   Ht 5' 3\" (1.6 m)   Wt 151 lb 0.2 oz (68.5 kg)   SpO2 93%   BMI 26.75 kg/m²     General appearance: No apparent distress, appears stated age and cooperative. HEENT: Normal cephalic, atraumatic without obvious deformity. Pupils equal, round, and reactive to light. Neck: Supple, with full range of motion. No jugular venous distention. Trachea midline. Respiratory:  Normal respiratory effort. Clear to auscultation, bilaterally without Rales/Wheezes/Rhonchi. Cardiovascular: Regular rate and rhythm without murmurs, rubs or gallops. Abdomen: Soft, tender to palpation upper abdomen, non-distended with active bowel sounds. Musculoskeletal: No clubbing, cyanosis or edema bilaterally. Skin: Pink, warm, dry. No rashes or lesions. Psychiatric: Alert and oriented, thought content appropriate, normal insight    Labs:   Recent Labs     10/11/20  0658   WBC 4.6*   HGB 13.5   HCT 41.2        Recent Labs     10/11/20  0658      K 4.1      CO2 25   BUN 13   CREATININE 0.6   CALCIUM 8.8     Recent Labs     10/10/20  1653 10/11/20  0658   AST 21 15   ALT 15 11   BILIDIR <0.2  --    BILITOT 0.7 0.7   ALKPHOS 94 70     No results for input(s): INR in the last 72 hours. Radiology:   CT Abd/Pelvis W 10/10/20  Impression         Extensive retroperitoneal edema or fibrosis. There is trace free fluid in the pelvis. There is bilateral pelvocaliectasis which would support the diagnosis of retroperitoneal fibrosis. No other acute findings. Code Status: No Order    ASSESSMENT:    1. Abdominal Pain  2. 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 ?Autoimmune Pancreatitis  4.  Dep/Anxiety- on Paxil at home      PLAN:     IgG4, ROSEY, CRP, ESR, TSH   MRI/MRCP   Pain and nausea control per primary   GI cocktail PRN   Start Protonix 40 mg daily   IV fluids per primary   After discussion with Dr. Kalpesh Finch, start prednisone 40 mg daily. HISORt criteria for AIP, will follow IgG4, MRI/MRCP and clinical course   Clear liquid diet   Supportive care per primary     Will follow       Case reviewed and impression/plan reviewed in collaboration with Dr. Kalpesh Finch  Electronically signed by FUNMI Nolasco CNP on 10/11/2020 at 8:00 AM    [facility]  Thank you for the consultation.

## 2020-10-12 ENCOUNTER — APPOINTMENT (OUTPATIENT)
Dept: MRI IMAGING | Age: 60
DRG: 391 | End: 2020-10-12
Payer: COMMERCIAL

## 2020-10-12 VITALS
BODY MASS INDEX: 26.08 KG/M2 | HEIGHT: 63 IN | TEMPERATURE: 97.5 F | SYSTOLIC BLOOD PRESSURE: 121 MMHG | RESPIRATION RATE: 18 BRPM | WEIGHT: 147.2 LBS | DIASTOLIC BLOOD PRESSURE: 65 MMHG | OXYGEN SATURATION: 95 % | HEART RATE: 86 BPM

## 2020-10-12 LAB
ALBUMIN SERPL-MCNC: 4 G/DL (ref 3.5–5.1)
ALP BLD-CCNC: 77 U/L (ref 38–126)
ALT SERPL-CCNC: 13 U/L (ref 11–66)
ANION GAP SERPL CALCULATED.3IONS-SCNC: 13 MEQ/L (ref 8–16)
AST SERPL-CCNC: 19 U/L (ref 5–40)
BASOPHILS # BLD: 0.4 %
BASOPHILS ABSOLUTE: 0 THOU/MM3 (ref 0–0.1)
BILIRUB SERPL-MCNC: 0.5 MG/DL (ref 0.3–1.2)
BUN BLDV-MCNC: 13 MG/DL (ref 7–22)
CALCIUM SERPL-MCNC: 9.4 MG/DL (ref 8.5–10.5)
CHLORIDE BLD-SCNC: 104 MEQ/L (ref 98–111)
CO2: 22 MEQ/L (ref 23–33)
CREAT SERPL-MCNC: 0.7 MG/DL (ref 0.4–1.2)
EOSINOPHIL # BLD: 0.4 %
EOSINOPHILS ABSOLUTE: 0 THOU/MM3 (ref 0–0.4)
ERYTHROCYTE [DISTWIDTH] IN BLOOD BY AUTOMATED COUNT: 12.1 % (ref 11.5–14.5)
ERYTHROCYTE [DISTWIDTH] IN BLOOD BY AUTOMATED COUNT: 43.8 FL (ref 35–45)
GFR SERPL CREATININE-BSD FRML MDRD: 85 ML/MIN/1.73M2
GLUCOSE BLD-MCNC: 92 MG/DL (ref 70–108)
HCT VFR BLD CALC: 43.4 % (ref 37–47)
HEMOGLOBIN: 14.2 GM/DL (ref 12–16)
IGG: 618 MG/DL (ref 700–1600)
IMMATURE GRANS (ABS): 0.02 THOU/MM3 (ref 0–0.07)
IMMATURE GRANULOCYTES: 0.4 %
LIPASE: 21.2 U/L (ref 5.6–51.3)
LYMPHOCYTES # BLD: 32.2 %
LYMPHOCYTES ABSOLUTE: 1.8 THOU/MM3 (ref 1–4.8)
MCH RBC QN AUTO: 32.1 PG (ref 26–33)
MCHC RBC AUTO-ENTMCNC: 32.7 GM/DL (ref 32.2–35.5)
MCV RBC AUTO: 98 FL (ref 81–99)
MONOCYTES # BLD: 6.5 %
MONOCYTES ABSOLUTE: 0.4 THOU/MM3 (ref 0.4–1.3)
NUCLEATED RED BLOOD CELLS: 0 /100 WBC
PLATELET # BLD: 172 THOU/MM3 (ref 130–400)
PMV BLD AUTO: 11.6 FL (ref 9.4–12.4)
POTASSIUM SERPL-SCNC: 3.9 MEQ/L (ref 3.5–5.2)
RBC # BLD: 4.43 MILL/MM3 (ref 4.2–5.4)
SEG NEUTROPHILS: 60.1 %
SEGMENTED NEUTROPHILS ABSOLUTE COUNT: 3.4 THOU/MM3 (ref 1.8–7.7)
SODIUM BLD-SCNC: 139 MEQ/L (ref 135–145)
TOTAL PROTEIN: 6.4 G/DL (ref 6.1–8)
WBC # BLD: 5.6 THOU/MM3 (ref 4.8–10.8)

## 2020-10-12 PROCEDURE — 6370000000 HC RX 637 (ALT 250 FOR IP): Performed by: NURSE PRACTITIONER

## 2020-10-12 PROCEDURE — 74183 MRI ABD W/O CNTR FLWD CNTR: CPT

## 2020-10-12 PROCEDURE — A9579 GAD-BASE MR CONTRAST NOS,1ML: HCPCS | Performed by: INTERNAL MEDICINE

## 2020-10-12 PROCEDURE — 6370000000 HC RX 637 (ALT 250 FOR IP): Performed by: INTERNAL MEDICINE

## 2020-10-12 PROCEDURE — 6360000004 HC RX CONTRAST MEDICATION: Performed by: INTERNAL MEDICINE

## 2020-10-12 PROCEDURE — 85025 COMPLETE CBC W/AUTO DIFF WBC: CPT

## 2020-10-12 PROCEDURE — 36415 COLL VENOUS BLD VENIPUNCTURE: CPT

## 2020-10-12 PROCEDURE — 2580000003 HC RX 258: Performed by: INTERNAL MEDICINE

## 2020-10-12 PROCEDURE — 83690 ASSAY OF LIPASE: CPT

## 2020-10-12 PROCEDURE — 80053 COMPREHEN METABOLIC PANEL: CPT

## 2020-10-12 RX ORDER — PANTOPRAZOLE SODIUM 40 MG/1
40 TABLET, DELAYED RELEASE ORAL
Qty: 30 TABLET | Refills: 1 | Status: SHIPPED | OUTPATIENT
Start: 2020-10-13 | End: 2021-09-27

## 2020-10-12 RX ORDER — PREDNISONE 20 MG/1
40 TABLET ORAL DAILY
Qty: 28 TABLET | Refills: 0 | Status: SHIPPED | OUTPATIENT
Start: 2020-10-13 | End: 2020-10-27

## 2020-10-12 RX ADMIN — LORAZEPAM 1 MG: 1 TABLET ORAL at 07:39

## 2020-10-12 RX ADMIN — FLUTICASONE PROPIONATE 2 SPRAY: 50 SPRAY, METERED NASAL at 07:38

## 2020-10-12 RX ADMIN — PAROXETINE HYDROCHLORIDE 20 MG: 20 TABLET, FILM COATED ORAL at 07:38

## 2020-10-12 RX ADMIN — GADOTERIDOL 15 ML: 279.3 INJECTION, SOLUTION INTRAVENOUS at 09:29

## 2020-10-12 RX ADMIN — PANTOPRAZOLE SODIUM 40 MG: 40 TABLET, DELAYED RELEASE ORAL at 05:26

## 2020-10-12 RX ADMIN — SODIUM CHLORIDE, POTASSIUM CHLORIDE, SODIUM LACTATE AND CALCIUM CHLORIDE: 600; 310; 30; 20 INJECTION, SOLUTION INTRAVENOUS at 05:26

## 2020-10-12 RX ADMIN — MAGNESIUM HYDROXIDE 30 ML: 2400 SUSPENSION ORAL at 15:01

## 2020-10-12 RX ADMIN — PREDNISONE 40 MG: 20 TABLET ORAL at 07:38

## 2020-10-12 ASSESSMENT — PAIN - FUNCTIONAL ASSESSMENT: PAIN_FUNCTIONAL_ASSESSMENT: ACTIVITIES ARE NOT PREVENTED

## 2020-10-12 ASSESSMENT — PAIN DESCRIPTION - DESCRIPTORS: DESCRIPTORS: ACHING

## 2020-10-12 ASSESSMENT — PAIN DESCRIPTION - LOCATION: LOCATION: ABDOMEN

## 2020-10-12 ASSESSMENT — PAIN DESCRIPTION - ONSET: ONSET: ON-GOING

## 2020-10-12 ASSESSMENT — PAIN DESCRIPTION - PAIN TYPE: TYPE: ACUTE PAIN

## 2020-10-12 ASSESSMENT — PAIN DESCRIPTION - PROGRESSION: CLINICAL_PROGRESSION: GRADUALLY IMPROVING

## 2020-10-12 ASSESSMENT — PAIN DESCRIPTION - FREQUENCY: FREQUENCY: CONTINUOUS

## 2020-10-12 ASSESSMENT — PAIN DESCRIPTION - ORIENTATION: ORIENTATION: MID

## 2020-10-12 ASSESSMENT — PAIN SCALES - GENERAL: PAINLEVEL_OUTOF10: 3

## 2020-10-12 NOTE — PROGRESS NOTES
Gastroenterology Progress Note:     Patient Name:  Arnoldo Hernández   MRN: [id number]  [id number]  YOB: 1960  Admit Date: 10/10/2020  3:49 PM  Primary Care Physician: Rafael Lozoya MD   [id number]     Patient seen and examined. 24 hours events and chart reviewed. Subjective: Patient resting on the cough,  present. She has some abdominal discomfort, but is maybe a little better. She has occasional nausea, no vomiting. She is wanting to go home. Objective:  /68   Pulse 84   Temp 98.8 °F (37.1 °C) (Oral)   Resp 18   Ht 5' 3\" (1.6 m)   Wt 147 lb 3.2 oz (66.8 kg)   SpO2 94%   BMI 26.08 kg/m²     Physical Exam:    General:  Nourished in no distress  HEENT: Atraumatic, normocephalic. Moist oral mucous membranes. Neck: Supple without adenopathy, JVD, thyromegaly or masses. Trachea midline. CV: Heart RRR, no murmurs, rubs, gallops. Resp: Even, easy without cough or accessory use. Lungs clear to ascultation bilaterally. Abd: Round, soft, tender throughout with palpation. No hepatosplenomegaly or mass present. Active bowel sounds heard. No distention noted. Ext:  Without cyanosis, clubbing, edema. Skin: Pink, warm, dry  Neuro:  Alert, oriented x 3 with no obvious deficits. Rectal: deferred    Labs:   CBC:   Lab Results   Component Value Date    WBC 5.6 10/12/2020    HGB 14.2 10/12/2020    HCT 43.4 10/12/2020    MCV 98.0 10/12/2020     10/12/2020     BMP:   Lab Results   Component Value Date     10/12/2020    K 3.9 10/12/2020     10/12/2020    CO2 22 10/12/2020    BUN 13 10/12/2020    CREATININE 0.7 10/12/2020    CALCIUM 9.4 10/12/2020     Lipids:   Lab Results   Component Value Date    ALKPHOS 77 10/12/2020    ALT 13 10/12/2020    AST 19 10/12/2020    BILITOT 0.5 10/12/2020    BILIDIR <0.2 10/10/2020    LABALBU 4.0 10/12/2020    LIPASE 21.2 10/12/2020      Ref. Range 10/11/2020 11:08   CRP Latest Ref Range: 0.00 - 1.00 mg/dl 1.10 (H)      Ref.  Range 10/11/2020 11:08   TSH Latest Ref Range: 0.400 - 4.200 uIU/mL 1.010      Ref. Range 10/11/2020 11:08   Sed Rate Latest Ref Range: 0 - 20 mm/hr 3     Significant Diagnostic Studies:   MRCP 10/12/20       Impression         Periaortic inflammation has nearly completely resolved. No abnormal enhancement or mass is seen. Current Meds:  Scheduled Meds:   fluticasone  2 spray Nasal Daily    PARoxetine  20 mg Oral Daily    pantoprazole  40 mg Oral QAM AC    predniSONE  40 mg Oral Daily    fentanNYL  50 mcg Intravenous Once    morphine  4 mg Intravenous Once     Continuous Infusions:   lactated ringers 100 mL/hr at 10/12/20 0526       Assessment:  63 yo F admitted 10/10/20 for abd pain, n/v. Epigastric pain radiating to the back. CT demonstrated extensive retroperitoneal edema or fibrosis, trace free fluid in the pelvis, bilateral pelviectasis, suspicious for retroperitoneal fibrosis. UA small leukocytes. MRCP demonstrated periaortic inflammation has nearly completely resolved, no abnormal enhancement or mass seen. 1. Abdominal pain- improving  2. Retroperitoneal edema vs fibrosis- noted on imaging  3. Question of autoimmune pancreatitis- IgG4 pending  4.  H/O depression & anxiety    Plan:     ROSEY & IgG4 pending   Pain & nausea control per primary   PPI daily, script sent   Prednisone 50mg daily, follow HISORt criteria for AIP, await IgG4, script sent   MOM now   Advance to full liquid diet, advance to low fat diet as tolerated   Supportive care per primary team   If tolerates low fat diet, okay to discharge from GI standpoint   Will need a 2 week follow-up with Gladys DANG    Case reviewed and impression/plan reviewed in collaboration with Dr. Uriel Souza  Electronically signed by FUNMI Hoff CNP on 10/12/2020 at 12:03 PM    [facility]

## 2020-10-12 NOTE — PROGRESS NOTES
IM Progress Note  Dr. Toya Casanova  10/12/2020 11:20 AM      Patient name Lionel Almendarez  DOB1960  PCP: Berhane Holman MD  Admit Date: 10/10/2020  Acct No. [de-identified]    Subjective: Interval History:   Pain much better  Back from MRI    Diet: DIET CLEAR LIQUID;    I/O last 3 completed shifts: In: 6146 [P.O.:300; I.V.:1054]  Out: -   No intake/output data recorded. Admission weight: 140 lb (63.5 kg) as of 10/10/2020  3:49 PM  Wt Readings from Last 3 Encounters:   10/12/20 147 lb 3.2 oz (66.8 kg)   06/12/20 145 lb 6 oz (65.9 kg)   04/08/19 145 lb (65.8 kg)     Body mass index is 26.08 kg/m². ROS   CVS;  no cp or palpitation  Resp: no SOB or cough  Neuro:  No numbness or weakness or dizziness  Abd: no nausea or vomiting or abd pain      Medications:   Scheduled Meds:   fluticasone  2 spray Nasal Daily    PARoxetine  20 mg Oral Daily    pantoprazole  40 mg Oral QAM AC    predniSONE  40 mg Oral Daily    fentanNYL  50 mcg Intravenous Once    morphine  4 mg Intravenous Once     Continuous Infusions:   lactated ringers 100 mL/hr at 10/12/20 0526       Labs :     CBC:   Recent Labs     10/10/20  1653 10/11/20  0658 10/12/20  0551   WBC 7.5 4.6* 5.6   HGB 15.3 13.5 14.2    145 172     BMP:    Recent Labs     10/10/20  1653 10/11/20  0658 10/12/20  0551    140 139   K 3.7 4.1 3.9    105 104   CO2 25 25 22*   BUN 16 13 13   CREATININE 0.6 0.6 0.7   GLUCOSE 107 87 92     Hepatic:   Recent Labs     10/10/20  1653 10/11/20  0658 10/12/20  0551   AST 21 15 19   ALT 15 11 13   BILITOT 0.7 0.7 0.5   ALKPHOS 94 70 77     Troponin: No results for input(s): TROPONINI in the last 72 hours. BNP: No results for input(s): BNP in the last 72 hours. Lipids:   Recent Labs     10/11/20  0658   CHOL 192   HDL 66     INR: No results for input(s): INR in the last 72 hours.     Radiology    Objective:   Vitals: /68   Pulse 84   Temp 98.8 °F (37.1 °C) (Oral)   Resp 18   Ht 5' 3\" (1.6 m)   Wt

## 2020-10-12 NOTE — CARE COORDINATION
10/12/20, 7:36 AM EDT  DISCHARGE PLANNING EVALUATION:    Chetan Kimble       Admitted from: ER 10/10/2020/ [address] day: 2   Location: [address] [place] [address] Reason for admit: Abdominal pain [R10.9] Status: IP   Admit order signed?: yes  PMH:  has no past medical history on file. Medications:  Scheduled Meds:   fluticasone  2 spray Nasal Daily    PARoxetine  20 mg Oral Daily    pantoprazole  40 mg Oral QAM AC    LORazepam  1 mg Oral Once    predniSONE  40 mg Oral Daily    fentanNYL  50 mcg Intravenous Once    morphine  4 mg Intravenous Once     Continuous Infusions:   lactated ringers 100 mL/hr at 10/12/20 0526      Pertinent Info/Orders/Treatment Plan:  CRP 1.10. IVF, prednisone and protonix started, pain and nausea control. GI consulted, MRCP ordered but patient unable to tolerate. She is considering trying it again with dose of ativan prior. Diet: DIET CLEAR LIQUID;   Smoking status:  reports that she has never smoked. She has never used smokeless tobacco.   PCP: Rome Cooks, MD  Readmission 30 days or less: no  Readmission Risk Score: 7%    Discharge Planning Evaluation  Current Residence:  Private Residence  Living Arrangements:  Spouse/Significant Other, Children   Support Systems:  Spouse/Significant Other, Children, Family Members  Current Services PTA:     Potential Assistance Needed:  N/A  Potential Assistance Purchasing Medications:  No  Does patient want to participate in local refill/ meds to beds program?  No  Type of Home Care Services:  None  Patient expects to be discharged to:  home  Expected Discharge date:  10/12/20  Follow Up Appointment: Best Day/ Time: Friday AM    Patient Goals/Plan/Treatment Preferences: Spoke with Margarette Garcia, she is from home with her  and children. She denies need for DME, HH, or med assistance at discharge. All basic needs are met. Transportation/Food Security/Housekeeping Addressed:  No issues identified.     Evaluation: no

## 2020-10-12 NOTE — PLAN OF CARE
Problem: Pain:  Goal: Pain level will decrease  Description: Pain level will decrease  10/12/2020 0836 by Aviva Hernandez RN  Outcome: Ongoing  Note: Pt verbalizes pain of a \"3\". Declines any mediation. Heat applied. Will continue to monitor. Problem: Pain:  Goal: Control of acute pain  Description: Control of acute pain  Outcome: Ongoing     Problem: Pain:  Goal: Control of chronic pain  Description: Control of chronic pain  Outcome: Ongoing     Problem: Discharge Planning:  Goal: Discharged to appropriate level of care  Description: Discharged to appropriate level of care  10/12/2020 0836 by Aviva Hernandez RN  Outcome: Ongoing  Note: Pt plans on returning home with  when medically stable. Problem: Falls - Risk of:  Goal: Will remain free from falls  Description: Will remain free from falls  10/12/2020 0836 by Aviva Hernandez RN  Outcome: Ongoing  Note: No falls this shift. Patient educated on not getting up without help. Falling star protocol in place. Call light in reach. Bed in lowest position. Bed alarm set. Patient visually checked on hourly rounds. Problem: Falls - Risk of:  Goal: Absence of physical injury  Description: Absence of physical injury  10/12/2020 0836 by Aviva Hernandez RN  Outcome: Ongoing     Care plan reviewed with patient. Patient verbalizes understanding of the plan of care and contribute to goal setting.

## 2020-10-12 NOTE — PLAN OF CARE
Problem: Pain:  Goal: Pain level will decrease  Description: Pain level will decrease  Outcome: Ongoing  Note: Patient free from pain this shift. Pain rated on 0-10 pain rating scale. Will continue to reassess. Problem: Discharge Planning:  Goal: Discharged to appropriate level of care  Description: Discharged to appropriate level of care  Outcome: Ongoing  Note: Patient plans to be discharged to home when medically stable. Problem: Falls - Risk of:  Goal: Will remain free from falls  Description: Will remain free from falls  Outcome: Ongoing  Note: Call light within reach. Side rails up x2. Bed alarm on. Non skid slippers available. Problem: Falls - Risk of:  Goal: Absence of physical injury  Description: Absence of physical injury  Outcome: Ongoing  Note: There has not been any episodes of physical injury at this time in the shift. Care plan reviewed with patient. Patient verbalize understanding of the plan of care and contribute to goal setting.

## 2020-10-13 ENCOUNTER — CARE COORDINATION (OUTPATIENT)
Dept: FAMILY MEDICINE CLINIC | Age: 60
End: 2020-10-13

## 2020-10-13 ENCOUNTER — TELEPHONE (OUTPATIENT)
Dept: FAMILY MEDICINE CLINIC | Age: 60
End: 2020-10-13

## 2020-10-13 RX ORDER — CIPROFLOXACIN AND DEXAMETHASONE 3; 1 MG/ML; MG/ML
4 SUSPENSION/ DROPS AURICULAR (OTIC) 2 TIMES DAILY
Qty: 1 BOTTLE | Refills: 0 | Status: SHIPPED | OUTPATIENT
Start: 2020-10-13 | End: 2020-10-13 | Stop reason: SDUPTHER

## 2020-10-13 RX ORDER — CIPROFLOXACIN AND DEXAMETHASONE 3; 1 MG/ML; MG/ML
4 SUSPENSION/ DROPS AURICULAR (OTIC) 2 TIMES DAILY
Qty: 1 BOTTLE | Refills: 0 | Status: SHIPPED | OUTPATIENT
Start: 2020-10-13 | End: 2020-10-20

## 2020-10-13 NOTE — TELEPHONE ENCOUNTER
Pt called stating that her left her is plugged due to ear infection. She stated that she gets them all the time and gets put on an antibiotic. She would like something called in for her.     Send to [facility] on [address] may be reached at [phone]

## 2020-10-13 NOTE — CARE COORDINATION
Lili[facility] Transitions Initial Follow Up Call  2  Outreach made within 2 business days of discharge: Yes    Patient: Iram Moser Patient : 1960   MRN: [id number]  Reason for Admission: There are no discharge diagnoses documented for the most recent discharge. Discharge Date: 10/12/20       Spoke with: Ana Laura    Discharge department/facility:[facility]    TCM Interactive Patient Contact:  Was patient able to fill all prescriptions: Yes  Was patient instructed to bring all medications to the follow-up visit: Yes  Is patient taking all medications as directed in the discharge summary? Yes  Does patient understand their discharge instructions: No: she has questions for GI and she will see them tomorrow. Does patient have questions or concerns that need addressed prior to 7-14 day follow up office visit: no    Scheduled appointment with PCP within 7-14 days    Follow Up  Following up with GI tomorrow    Rayo Robert RN   She is doing OK since getting home. She follows up tomorrow with GI and she has questions for them about the medications they told her to stop. She needs no help at home and I told her we would be following up with her weekly to make sure her needs are met. Meds were reconciled.

## 2020-10-14 LAB — ANA SCREEN: NORMAL

## 2020-10-14 NOTE — DISCHARGE SUMMARY
[address] [place] [address]                               DISCHARGE SUMMARY    PATIENT NAME: Tena Champion                    :        1960  MED REC NO:   [id number]                           ROOM:       [id number]  ACCOUNT NO:   [de-identified]                           ADMIT DATE: 10/10/2020  PROVIDER:     MERY Zavalaint: 10/12/2020    HOSPITAL COURSE:  This is a 71-year-old woman with no significant prior  medical problems, presented with acute onset of abdominal pain. Her  initial CAT scan was suggestive of possible retroperitoneal fibrosis  versus edema. GI was consulted. There was concern if she had  autoimmune pancreatitis also, but her pancreas did not show any  inflammation nor did her enzymes show any increase. The patient was  continued on supportive care with pain medication and fluids and GI did  order workup including ROSEY IgG _____ and an MRI was ordered, but the MRI  showed _____ inflammation, being nearly completely resolved. Her pain  had also resolved. She was not requiring any pain medications, unclear  the reason for significant changes in initial CAT scan presentation. The patient will follow up with GI as an outpatient and also with the  family physician. Since she was tolerating diet and not requiring any  pain medications, she is being discharged to continue workup as an  outpatient. FINAL DIAGNOSES:  1. Abdominal pain with CT suggestive of extensive retroperitoneal edema  versus fibrosis which had complete resolution on followup MRI. Studies  are being continued to complete as an outpatient. 2.  Anxiety. DISPOSITION:  Home. CONDITION ON DISCHARGE:  Stable. MEDICATIONS:  To continue as addressed in the med rec sheet. DIET:  As tolerated. ACTIVITY:  As tolerated. FOLLOWUP:  With family physician.     Spent more than 30 minutes that involved examining the patient,  reviewing the chart, and reconciling the med rec sheet.         Naldo Lacy M.D.    D: 10/13/2020 10:55:07       T: 10/13/2020 11:57:04     [email or web address]  Job#: [id number]     Doc#: [id number]    CC:

## 2020-10-21 ENCOUNTER — HOSPITAL ENCOUNTER (OUTPATIENT)
Dept: ULTRASOUND IMAGING | Age: 60
Discharge: HOME | End: 2020-10-21
Payer: COMMERCIAL

## 2020-10-21 ENCOUNTER — HOSPITAL ENCOUNTER (OUTPATIENT)
Dept: NUCLEAR MEDICINE | Age: 60
Discharge: HOME | End: 2020-10-21
Payer: COMMERCIAL

## 2020-10-21 VITALS — BODY MASS INDEX: 24.8 KG/M2 | WEIGHT: 140 LBS

## 2020-10-21 PROCEDURE — 76705 ECHO EXAM OF ABDOMEN: CPT

## 2020-10-21 PROCEDURE — 6360000002 HC RX W HCPCS: Performed by: RADIOLOGY

## 2020-10-21 PROCEDURE — 78227 HEPATOBIL SYST IMAGE W/DRUG: CPT

## 2020-10-21 PROCEDURE — 3430000000 HC RX DIAGNOSTIC RADIOPHARMACEUTICAL: Performed by: NURSE PRACTITIONER

## 2020-10-21 PROCEDURE — 2580000003 HC RX 258: Performed by: RADIOLOGY

## 2020-10-21 PROCEDURE — A9537 TC99M MEBROFENIN: HCPCS | Performed by: NURSE PRACTITIONER

## 2020-10-21 RX ADMIN — SODIUM CHLORIDE 1.27 MCG: 9 INJECTION, SOLUTION INTRAVENOUS at 08:26

## 2020-10-21 RX ADMIN — Medication 8 MILLICURIE: at 07:15

## 2020-10-22 ENCOUNTER — CARE COORDINATION (OUTPATIENT)
Dept: FAMILY MEDICINE CLINIC | Age: 60
End: 2020-10-22

## 2020-10-27 ENCOUNTER — HOSPITAL ENCOUNTER (OUTPATIENT)
Dept: WOMENS IMAGING | Age: 60
Discharge: HOME | End: 2020-10-27
Payer: COMMERCIAL

## 2020-10-27 ENCOUNTER — CARE COORDINATION (OUTPATIENT)
Dept: FAMILY MEDICINE CLINIC | Age: 60
End: 2020-10-27

## 2020-10-27 PROCEDURE — 77067 SCR MAMMO BI INCL CAD: CPT

## 2020-11-03 ENCOUNTER — CARE COORDINATION (OUTPATIENT)
Dept: FAMILY MEDICINE CLINIC | Age: 60
End: 2020-11-03

## 2020-12-16 ENCOUNTER — HOSPITAL ENCOUNTER (OUTPATIENT)
Dept: CT IMAGING | Age: 60
Discharge: HOME | End: 2020-12-16
Payer: COMMERCIAL

## 2020-12-16 PROCEDURE — 74177 CT ABD & PELVIS W/CONTRAST: CPT

## 2020-12-16 PROCEDURE — 6360000004 HC RX CONTRAST MEDICATION: Performed by: NURSE PRACTITIONER

## 2020-12-16 RX ADMIN — IOPAMIDOL 80 ML: 755 INJECTION, SOLUTION INTRAVENOUS at 15:49

## 2020-12-16 RX ADMIN — IOHEXOL 50 ML: 240 INJECTION, SOLUTION INTRATHECAL; INTRAVASCULAR; INTRAVENOUS; ORAL at 15:49

## 2021-01-11 ENCOUNTER — TELEPHONE (OUTPATIENT)
Dept: FAMILY MEDICINE CLINIC | Age: 61
End: 2021-01-11

## 2021-01-11 DIAGNOSIS — N39.9 SYNDROME OF URINARY TRACT: Primary | ICD-10-CM

## 2021-01-11 RX ORDER — NITROFURANTOIN 25; 75 MG/1; MG/1
100 CAPSULE ORAL 2 TIMES DAILY
Qty: 14 CAPSULE | Refills: 0 | Status: SHIPPED | OUTPATIENT
Start: 2021-01-11 | End: 2021-01-18

## 2021-05-03 ENCOUNTER — TELEPHONE (OUTPATIENT)
Dept: FAMILY MEDICINE CLINIC | Age: 61
End: 2021-05-03

## 2021-05-03 RX ORDER — PAROXETINE HYDROCHLORIDE 20 MG/1
TABLET, FILM COATED ORAL
Qty: 90 TABLET | Refills: 0 | Status: SHIPPED | OUTPATIENT
Start: 2021-05-03 | End: 2021-08-09

## 2021-05-03 NOTE — TELEPHONE ENCOUNTER
Date of last visit:  6/12/2020   Date of next visit:  Visit date not found    Requested Prescriptions     Pending Prescriptions Disp Refills    PARoxetine (PAXIL) 20 MG tablet 90 tablet 1     Sig: TAKE 1 TABLET BY MOUTH EVERY DAY IN THE MORNING

## 2021-06-09 ENCOUNTER — OFFICE VISIT (OUTPATIENT)
Dept: FAMILY MEDICINE CLINIC | Age: 61
End: 2021-06-09

## 2021-06-09 VITALS
HEART RATE: 76 BPM | TEMPERATURE: 97.1 F | HEIGHT: 63 IN | DIASTOLIC BLOOD PRESSURE: 66 MMHG | SYSTOLIC BLOOD PRESSURE: 118 MMHG | RESPIRATION RATE: 16 BRPM | BODY MASS INDEX: 24.8 KG/M2

## 2021-06-09 DIAGNOSIS — F41.9 ANXIETY: ICD-10-CM

## 2021-06-09 DIAGNOSIS — E78.5 HYPERLIPIDEMIA, UNSPECIFIED HYPERLIPIDEMIA TYPE: Primary | ICD-10-CM

## 2021-06-09 DIAGNOSIS — E55.9 VITAMIN D DEFICIENCY: ICD-10-CM

## 2021-06-09 PROBLEM — R10.9 ABDOMINAL PAIN: Status: RESOLVED | Noted: 2020-10-10 | Resolved: 2021-06-09

## 2021-06-09 PROBLEM — M17.9 OSTEOARTHRITIS OF KNEE: Status: ACTIVE | Noted: 2021-06-09

## 2021-06-09 PROBLEM — E78.49 OTHER HYPERLIPIDEMIA: Status: ACTIVE | Noted: 2021-06-09

## 2021-06-09 PROCEDURE — 99213 OFFICE O/P EST LOW 20 MIN: CPT | Performed by: FAMILY MEDICINE

## 2021-06-09 RX ORDER — TURMERIC 400 MG
CAPSULE ORAL
COMMUNITY

## 2021-06-09 RX ORDER — BIOTIN 10000 MCG
CAPSULE ORAL
COMMUNITY

## 2021-06-09 SDOH — ECONOMIC STABILITY: FOOD INSECURITY: WITHIN THE PAST 12 MONTHS, YOU WORRIED THAT YOUR FOOD WOULD RUN OUT BEFORE YOU GOT MONEY TO BUY MORE.: NEVER TRUE

## 2021-06-09 SDOH — ECONOMIC STABILITY: FOOD INSECURITY: WITHIN THE PAST 12 MONTHS, THE FOOD YOU BOUGHT JUST DIDN'T LAST AND YOU DIDN'T HAVE MONEY TO GET MORE.: NEVER TRUE

## 2021-06-09 ASSESSMENT — PATIENT HEALTH QUESTIONNAIRE - PHQ9
SUM OF ALL RESPONSES TO PHQ QUESTIONS 1-9: 0
1. LITTLE INTEREST OR PLEASURE IN DOING THINGS: 0
2. FEELING DOWN, DEPRESSED OR HOPELESS: 0
SUM OF ALL RESPONSES TO PHQ9 QUESTIONS 1 & 2: 0
SUM OF ALL RESPONSES TO PHQ QUESTIONS 1-9: 0
SUM OF ALL RESPONSES TO PHQ QUESTIONS 1-9: 0

## 2021-06-09 ASSESSMENT — ENCOUNTER SYMPTOMS
NAUSEA: 0
DIARRHEA: 0
EYES NEGATIVE: 1
CONSTIPATION: 0
COUGH: 0
SHORTNESS OF BREATH: 0
ABDOMINAL PAIN: 0
CHEST TIGHTNESS: 0
VOMITING: 0

## 2021-06-09 ASSESSMENT — SOCIAL DETERMINANTS OF HEALTH (SDOH): HOW HARD IS IT FOR YOU TO PAY FOR THE VERY BASICS LIKE FOOD, HOUSING, MEDICAL CARE, AND HEATING?: NOT HARD AT ALL

## 2021-06-09 NOTE — PROGRESS NOTES
Date: 6/9/2021    Moshe Ellington is a 62 y.o. female who presents today for:  Chief Complaint   Patient presents with    Check-Up       HPI:     HPI    has a current medication list which includes the following prescription(s): biotin, turmeric, paroxetine, vitamin b-6, pantoprazole, folic acid, and fish oil. No Known Allergies    Social History     Tobacco Use    Smoking status: Never Smoker    Smokeless tobacco: Never Used   Substance Use Topics    Alcohol use: No    Drug use: No       Past Medical History:   Diagnosis Date    Osteoarthritis of knee 6/9/2021    Other hyperlipidemia 6/9/2021       Past Surgical History:   Procedure Laterality Date    ADENOIDECTOMY      APPENDECTOMY      CATARACT REMOVAL Right 11/12/15    Dr Tejal Calderon MYRINGOTOMY AND TYMPANOSTOMY TUBE PLACEMENT      SKIN CANCER EXCISION Right 11/11/15    shoulder, Dr Adelita Jenkins History   Problem Relation Age of Onset    Heart Disease Mother      Subjective:     Review of Systems   Constitutional: Negative for activity change, appetite change, diaphoresis, fatigue and fever. HENT: Negative. Eyes: Negative. Respiratory: Negative for cough, chest tightness and shortness of breath. Cardiovascular: Negative for chest pain, palpitations and leg swelling. Gastrointestinal: Negative for abdominal pain, constipation, diarrhea, nausea and vomiting. Genitourinary: Negative. Musculoskeletal: Negative. Skin: Negative for rash. She has hair loss and is seeing Dr Annette Ashby for it. Neurological: Negative for dizziness, syncope, weakness, light-headedness, numbness and headaches.    Psychiatric/Behavioral: Negative.        :   /66 (Site: Left Upper Arm, Position: Sitting, Cuff Size: Medium Adult)   Pulse 76   Temp 97.1 °F (36.2 °C) (Skin)   Resp 16   Ht 5' 3\" (1.6 m)   BMI 24.80 kg/m²   Wt Readings from Last 3 Encounters:   10/21/20 140 lb (63.5 kg)   10/12/20 147 lb 3.2 oz (66.8 kg)   06/12/20 145 lb 6 oz (65.9 kg)     Physical Exam  :       Diagnosis Orders   1. Hyperlipidemia, unspecified hyperlipidemia type     2. Vitamin D deficiency  Vitamin D 25 Hydroxy   3. Anxiety         :      Requested Prescriptions      No prescriptions requested or ordered in this encounter     Current Outpatient Medications   Medication Sig Dispense Refill    Biotin 10 MG CAPS Take by mouth      Turmeric 400 MG CAPS Take by mouth      PARoxetine (PAXIL) 20 MG tablet TAKE 1 TABLET BY MOUTH EVERY DAY IN THE MORNING 90 tablet 0    vitamin B-6 (PYRIDOXINE) 100 MG tablet Take 150 mg by mouth daily      pantoprazole (PROTONIX) 40 MG tablet Take 1 tablet by mouth every morning (before breakfast) (Patient not taking: Reported on 6/9/2021) 30 tablet 1    folic acid (FOLVITE) 574 MCG tablet Take 800 mcg by mouth daily (Patient not taking: Reported on 6/9/2021)      Omega-3 Fatty Acids (FISH OIL) 1000 MG CAPS Take 3,000 mg by mouth daily (Patient not taking: Reported on 6/9/2021)       No current facility-administered medications for this visit. Orders Placed This Encounter   Procedures    Vitamin D 25 Hydroxy     Standing Status:   Future     Standing Expiration Date:   6/9/2022       Continue current medications. Return in about 6 months (around 12/9/2021), or if symptoms worsen or fail to improve. Discussed use, benefit, and side effects of prescribed medications. All patient questions answered. Pt voiced understanding. Instructed to continue current medications,diet and exercise. Patient agreed with treatment plan.

## 2021-08-09 RX ORDER — PAROXETINE HYDROCHLORIDE 20 MG/1
TABLET, FILM COATED ORAL
Qty: 90 TABLET | Refills: 1 | Status: SHIPPED | OUTPATIENT
Start: 2021-08-09 | End: 2022-08-31

## 2021-08-09 NOTE — TELEPHONE ENCOUNTER
Date of last visit:  6/9/2021   Date of next visit:  Visit date not found    Requested Prescriptions     Pending Prescriptions Disp Refills    PARoxetine (PAXIL) 20 MG tablet [Pharmacy Med Name: PAROXETINE HCL 20 MG TABLET] 90 tablet 1     Sig: TAKE 1 TABLET BY MOUTH EVERY DAY IN THE MORNING

## 2021-09-11 ENCOUNTER — APPOINTMENT (OUTPATIENT)
Dept: GENERAL RADIOLOGY | Age: 61
End: 2021-09-11
Payer: COMMERCIAL

## 2021-09-11 ENCOUNTER — HOSPITAL ENCOUNTER (EMERGENCY)
Age: 61
Discharge: HOME | End: 2021-09-11
Payer: COMMERCIAL

## 2021-09-11 ENCOUNTER — HOSPITAL ENCOUNTER (EMERGENCY)
Age: 61
Discharge: HOME | End: 2021-09-11
Attending: EMERGENCY MEDICINE
Payer: COMMERCIAL

## 2021-09-11 VITALS
TEMPERATURE: 101.5 F | HEIGHT: 63 IN | HEART RATE: 79 BPM | DIASTOLIC BLOOD PRESSURE: 56 MMHG | RESPIRATION RATE: 18 BRPM | OXYGEN SATURATION: 96 % | WEIGHT: 140 LBS | BODY MASS INDEX: 24.8 KG/M2 | SYSTOLIC BLOOD PRESSURE: 116 MMHG

## 2021-09-11 VITALS
HEART RATE: 93 BPM | TEMPERATURE: 100.2 F | OXYGEN SATURATION: 97 % | DIASTOLIC BLOOD PRESSURE: 78 MMHG | SYSTOLIC BLOOD PRESSURE: 101 MMHG | RESPIRATION RATE: 16 BRPM

## 2021-09-11 DIAGNOSIS — U07.1 COVID-19 VIRUS INFECTION: Primary | ICD-10-CM

## 2021-09-11 LAB
ANION GAP SERPL CALCULATED.3IONS-SCNC: 13 MEQ/L (ref 8–16)
BASOPHILS # BLD: 0.3 %
BASOPHILS ABSOLUTE: 0 THOU/MM3 (ref 0–0.1)
BUN BLDV-MCNC: 14 MG/DL (ref 7–22)
CALCIUM SERPL-MCNC: 8.7 MG/DL (ref 8.5–10.5)
CHLORIDE BLD-SCNC: 95 MEQ/L (ref 98–111)
CO2: 24 MEQ/L (ref 23–33)
CREAT SERPL-MCNC: 0.9 MG/DL (ref 0.4–1.2)
EKG ATRIAL RATE: 71 BPM
EKG P AXIS: 58 DEGREES
EKG P-R INTERVAL: 110 MS
EKG Q-T INTERVAL: 370 MS
EKG QRS DURATION: 76 MS
EKG QTC CALCULATION (BAZETT): 402 MS
EKG R AXIS: 73 DEGREES
EKG T AXIS: 73 DEGREES
EKG VENTRICULAR RATE: 71 BPM
EOSINOPHIL # BLD: 0 %
EOSINOPHILS ABSOLUTE: 0 THOU/MM3 (ref 0–0.4)
ERYTHROCYTE [DISTWIDTH] IN BLOOD BY AUTOMATED COUNT: 12 % (ref 11.5–14.5)
ERYTHROCYTE [DISTWIDTH] IN BLOOD BY AUTOMATED COUNT: 42.5 FL (ref 35–45)
GFR SERPL CREATININE-BSD FRML MDRD: 64 ML/MIN/1.73M2
GLUCOSE BLD-MCNC: 103 MG/DL (ref 70–108)
HCT VFR BLD CALC: 44.8 % (ref 37–47)
HEMOGLOBIN: 15.1 GM/DL (ref 12–16)
IMMATURE GRANS (ABS): 0.01 THOU/MM3 (ref 0–0.07)
IMMATURE GRANULOCYTES: 0.3 %
LYMPHOCYTES # BLD: 27.7 %
LYMPHOCYTES ABSOLUTE: 0.9 THOU/MM3 (ref 1–4.8)
MCH RBC QN AUTO: 32.3 PG (ref 26–33)
MCHC RBC AUTO-ENTMCNC: 33.7 GM/DL (ref 32.2–35.5)
MCV RBC AUTO: 95.9 FL (ref 81–99)
MONOCYTES # BLD: 6.4 %
MONOCYTES ABSOLUTE: 0.2 THOU/MM3 (ref 0.4–1.3)
NUCLEATED RED BLOOD CELLS: 0 /100 WBC
OSMOLALITY CALCULATION: 265.2 MOSMOL/KG (ref 275–300)
PLATELET # BLD: 94 THOU/MM3 (ref 130–400)
PLATELET ESTIMATE: ABNORMAL
PMV BLD AUTO: 11.7 FL (ref 9.4–12.4)
POTASSIUM REFLEX MAGNESIUM: 3.7 MEQ/L (ref 3.5–5.2)
RBC # BLD: 4.67 MILL/MM3 (ref 4.2–5.4)
SCAN OF BLOOD SMEAR: NORMAL
SEG NEUTROPHILS: 65.3 %
SEGMENTED NEUTROPHILS ABSOLUTE COUNT: 2.2 THOU/MM3 (ref 1.8–7.7)
SODIUM BLD-SCNC: 132 MEQ/L (ref 135–145)
WBC # BLD: 3.4 THOU/MM3 (ref 4.8–10.8)

## 2021-09-11 PROCEDURE — 96361 HYDRATE IV INFUSION ADD-ON: CPT

## 2021-09-11 PROCEDURE — 85025 COMPLETE CBC W/AUTO DIFF WBC: CPT

## 2021-09-11 PROCEDURE — 36415 COLL VENOUS BLD VENIPUNCTURE: CPT

## 2021-09-11 PROCEDURE — 71045 X-RAY EXAM CHEST 1 VIEW: CPT

## 2021-09-11 PROCEDURE — 99213 OFFICE O/P EST LOW 20 MIN: CPT | Performed by: NURSE PRACTITIONER

## 2021-09-11 PROCEDURE — 99282 EMERGENCY DEPT VISIT SF MDM: CPT

## 2021-09-11 PROCEDURE — 2580000003 HC RX 258: Performed by: EMERGENCY MEDICINE

## 2021-09-11 PROCEDURE — 80048 BASIC METABOLIC PNL TOTAL CA: CPT

## 2021-09-11 PROCEDURE — 99213 OFFICE O/P EST LOW 20 MIN: CPT

## 2021-09-11 PROCEDURE — 93005 ELECTROCARDIOGRAM TRACING: CPT | Performed by: EMERGENCY MEDICINE

## 2021-09-11 PROCEDURE — 96360 HYDRATION IV INFUSION INIT: CPT

## 2021-09-11 RX ORDER — 0.9 % SODIUM CHLORIDE 0.9 %
1000 INTRAVENOUS SOLUTION INTRAVENOUS ONCE
Status: COMPLETED | OUTPATIENT
Start: 2021-09-11 | End: 2021-09-11

## 2021-09-11 RX ORDER — ACETAMINOPHEN 500 MG
1000 TABLET ORAL ONCE
Status: DISCONTINUED | OUTPATIENT
Start: 2021-09-11 | End: 2021-09-11 | Stop reason: HOSPADM

## 2021-09-11 RX ADMIN — SODIUM CHLORIDE 1000 ML: 9 INJECTION, SOLUTION INTRAVENOUS at 19:25

## 2021-09-11 ASSESSMENT — PAIN DESCRIPTION - PAIN TYPE: TYPE: ACUTE PAIN

## 2021-09-11 ASSESSMENT — ENCOUNTER SYMPTOMS
NAUSEA: 0
CHEST TIGHTNESS: 0
RHINORRHEA: 0
TROUBLE SWALLOWING: 0
WHEEZING: 0
VOMITING: 0
EYE DISCHARGE: 0
SORE THROAT: 0
COUGH: 1
EYE REDNESS: 0
SHORTNESS OF BREATH: 0
DIARRHEA: 0

## 2021-09-11 ASSESSMENT — PAIN DESCRIPTION - FREQUENCY: FREQUENCY: CONTINUOUS

## 2021-09-11 ASSESSMENT — PAIN DESCRIPTION - DESCRIPTORS: DESCRIPTORS: ACHING

## 2021-09-11 ASSESSMENT — PAIN DESCRIPTION - LOCATION: LOCATION: GENERALIZED

## 2021-09-11 ASSESSMENT — PAIN SCALES - GENERAL: PAINLEVEL_OUTOF10: 8

## 2021-09-11 NOTE — ED TRIAGE NOTES
Audra Gardiner arrives to room with complaint of positive covid with moderate symptoms believes she is dehydrated symptoms started 5 days ago. Audra Gardiner took an at home covid test and it was positive.

## 2021-09-11 NOTE — ED PROVIDER NOTES
[facility] Encounter      [facility]       Chief Complaint   Patient presents with    Positive For Covid-19    Fatigue    Cough    Congestion    Generalized Body Aches    Fever    Headache       Nurses Notes reviewed and I agree except as noted in the HPI. HISTORY OF PRESENT ILLNESS   Obed Hicks is a 62 y.o. female who presents with complaints of COVID-19 infection. Onset of symptoms Monday, unchanged. Patient had taken at home COVID-19 test was positive. She complains of cough. Cough is intermittent, dry. Associated congestion, fever, fatigue. Patient concerned for possible dehydration. She states that she has been pushing fluids over the last 24 hours. She states that her  is ill with COVID-19 as well. She notes that her heart rate is in the 90s at home. States it felt like it was beating fast/fluttering at times. Denies chest pain or shortness of breath. No treatment prior to arrival.    REVIEW OF SYSTEMS     Review of Systems   Constitutional: Positive for fatigue and fever. Negative for chills and diaphoresis. HENT: Positive for congestion. Negative for ear pain, rhinorrhea, sore throat and trouble swallowing. Eyes: Negative for discharge and redness. Respiratory: Positive for cough. Negative for chest tightness, shortness of breath and wheezing. Cardiovascular: Negative for chest pain. Gastrointestinal: Negative for diarrhea, nausea and vomiting. Genitourinary: Negative for decreased urine volume. Musculoskeletal: Positive for myalgias. Negative for neck pain and neck stiffness. Skin: Negative for rash. Neurological: Positive for headaches. Negative for dizziness. Hematological: Negative for adenopathy. Psychiatric/Behavioral: Negative for sleep disturbance.        PAST MEDICAL HISTORY         Diagnosis Date    Osteoarthritis of knee 6/9/2021    Other hyperlipidemia 6/9/2021       SURGICAL HISTORY     Patient has a past surgical history that includes Tonsillectomy; Adenoidectomy; Appendectomy; Dilation and curettage of uterus; Myringotomy Tympanostomy Tube Placement; Cataract removal (Right, 11/12/15); and Skin cancer excision (Right, 11/11/15). CURRENT MEDICATIONS       Discharge Medication List as of 9/11/2021  5:39 PM      CONTINUE these medications which have NOT CHANGED    Details   PARoxetine (PAXIL) 20 MG tablet TAKE 1 TABLET BY MOUTH EVERY DAY IN THE MORNING, Disp-90 tablet, R-1Normal      Biotin 10 MG CAPS Take by mouthHistorical Med      Turmeric 400 MG CAPS Take by mouthHistorical Med      pantoprazole (PROTONIX) 40 MG tablet Take 1 tablet by mouth every morning (before breakfast), Disp-30 [id number]      folic acid (FOLVITE) 041 MCG tablet Take 800 mcg by mouth dailyHistorical Med      vitamin B-6 (PYRIDOXINE) 100 MG tablet Take 150 mg by mouth dailyHistorical Med      Omega-3 Fatty Acids (FISH OIL) 1000 MG CAPS Take 3,000 mg by mouth dailyHistorical Med             ALLERGIES     Patient is has No Known Allergies. FAMILY HISTORY     Patient'sfamily history includes Heart Disease in her mother. SOCIAL HISTORY     Patient  reports that she has never smoked. She has never used smokeless tobacco. She reports that she does not drink alcohol and does not use drugs. PHYSICAL EXAM     ED TRIAGE VITALS  BP: 101/78, Temp: 100.2 °F (37.9 °C), Pulse: 93, Resp: 16, SpO2: 97 %  Physical Exam  Vitals and nursing note reviewed. Constitutional:       General: She is not in acute distress. Appearance: Normal appearance. She is well-developed. She is not ill-appearing, toxic-appearing or diaphoretic. HENT:      Head: Normocephalic and atraumatic. Right Ear: Hearing, tympanic membrane, ear canal and external ear normal. No mastoid tenderness. No hemotympanum. Tympanic membrane is not perforated, erythematous or bulging.       Left Ear: Hearing, tympanic membrane, ear canal and external ear normal. No mastoid tenderness. No hemotympanum. Tympanic membrane is not perforated, erythematous or bulging. Nose: Congestion present. No rhinorrhea. Mouth/Throat:      Mouth: Mucous membranes are moist.      Pharynx: Oropharynx is clear. Uvula midline. Tonsils: 0 on the right. 0 on the left. Eyes:      General: No scleral icterus. Conjunctiva/sclera: Conjunctivae normal.      Right eye: Right conjunctiva is not injected. No hemorrhage. Left eye: Left conjunctiva is not injected. No hemorrhage. Neck:      Thyroid: No thyromegaly. Trachea: Trachea normal.   Cardiovascular:      Rate and Rhythm: Normal rate and regular rhythm. No extrasystoles are present. Chest Wall: PMI is not displaced. Heart sounds: Normal heart sounds. No murmur heard. No friction rub. No gallop. Pulmonary:      Effort: Pulmonary effort is normal. No respiratory distress. Breath sounds: Normal breath sounds. Musculoskeletal:      Cervical back: Normal range of motion and neck supple. Lymphadenopathy:      Head:      Right side of head: No submental, submandibular, tonsillar or occipital adenopathy. Left side of head: No submental, submandibular, tonsillar or occipital adenopathy. Cervical: No cervical adenopathy. Upper Body:      Right upper body: No supraclavicular adenopathy. Left upper body: No supraclavicular adenopathy. Skin:     General: Skin is warm and dry. Capillary Refill: Capillary refill takes less than 2 seconds. Coloration: Skin is not pale. Findings: No rash. Comments: Skin warm and dry to touch, no rashes noted on exposed surfaces. Neurological:      Mental Status: She is alert and oriented to person, place, and time. She is not disoriented. Psychiatric:         Mood and Affect: Mood normal.         Behavior: Behavior is cooperative. DIAGNOSTIC RESULTS   Labs: No results found for this visit on 09/11/21.     IMAGING:  No orders to display     URGENT CARE COURSE:     Vitals:    09/11/21 1704 09/11/21 1743   BP: 101/78    Pulse: 91 93   Resp: 16    Temp: 100.2 °F (37.9 °C)    TempSrc: Temporal    SpO2: 93% 97%       Medications - No data to display  PROCEDURES:  None  FINALIMPRESSION      1. COVID-19 virus infection        DISPOSITION/PLAN   DISPOSITION Decision To Discharge 09/11/2021 05:38:30 PM    SPO2 increased to 97%. No acute distress. Patient notes positive home COVID-19 test.  Patient given a pulse oximeter to take home to perform spot checks as needed. Advised to push fluids. No concerns for dehydration at this time. Advised if symptoms worsen go to ER.    1815 - Patient called with concerns. She spoke with her daughter who is a nurse. She was concerned that she didn't have any testing done. She was advised that labs were not recommended based on VS and PE. She also noted her \"heart flutter\" again. Advised physical exam normal.  I advised her that if symptoms return that she should go to ER for further evaluation. She acknowledged understanding. PATIENT REFERRED TO:  Tien Machado MD  [address]  [facility] [phone]      Follow up as needed. Push fluids. Spot check pulse ox on clean fingernail. If worse go to ER.     DISCHARGE MEDICATIONS:  Discharge Medication List as of 9/11/2021  5:39 PM        Discharge Medication List as of 9/11/2021  5:39 PM          [address] Jan[address]man, APRN - CNP  09/11/21 [address], FUNMI - CNP  09/11/21 0786

## 2021-09-12 ENCOUNTER — HOSPITAL ENCOUNTER (EMERGENCY)
Age: 61
Discharge: HOME | End: 2021-09-12
Attending: EMERGENCY MEDICINE
Payer: COMMERCIAL

## 2021-09-12 ENCOUNTER — APPOINTMENT (OUTPATIENT)
Dept: CT IMAGING | Age: 61
End: 2021-09-12
Payer: COMMERCIAL

## 2021-09-12 VITALS
SYSTOLIC BLOOD PRESSURE: 108 MMHG | BODY MASS INDEX: 24.8 KG/M2 | RESPIRATION RATE: 28 BRPM | TEMPERATURE: 98 F | HEIGHT: 63 IN | WEIGHT: 140 LBS | DIASTOLIC BLOOD PRESSURE: 76 MMHG | HEART RATE: 76 BPM | OXYGEN SATURATION: 94 %

## 2021-09-12 DIAGNOSIS — I49.3 PREMATURE VENTRICULAR CONTRACTIONS: Primary | ICD-10-CM

## 2021-09-12 DIAGNOSIS — E86.0 DEHYDRATION: ICD-10-CM

## 2021-09-12 DIAGNOSIS — U07.1 COVID-19: ICD-10-CM

## 2021-09-12 LAB
ANION GAP SERPL CALCULATED.3IONS-SCNC: 9 MEQ/L (ref 8–16)
BASOPHILS # BLD: 0.3 %
BASOPHILS ABSOLUTE: 0 THOU/MM3 (ref 0–0.1)
BUN BLDV-MCNC: 12 MG/DL (ref 7–22)
CALCIUM SERPL-MCNC: 8.8 MG/DL (ref 8.5–10.5)
CHLORIDE BLD-SCNC: 99 MEQ/L (ref 98–111)
CO2: 25 MEQ/L (ref 23–33)
CREAT SERPL-MCNC: 0.7 MG/DL (ref 0.4–1.2)
EOSINOPHIL # BLD: 0 %
EOSINOPHILS ABSOLUTE: 0 THOU/MM3 (ref 0–0.4)
ERYTHROCYTE [DISTWIDTH] IN BLOOD BY AUTOMATED COUNT: 11.9 % (ref 11.5–14.5)
ERYTHROCYTE [DISTWIDTH] IN BLOOD BY AUTOMATED COUNT: 41.4 FL (ref 35–45)
GFR SERPL CREATININE-BSD FRML MDRD: 85 ML/MIN/1.73M2
GLUCOSE BLD-MCNC: 121 MG/DL (ref 70–108)
HCT VFR BLD CALC: 40.4 % (ref 37–47)
HEMOGLOBIN: 13.9 GM/DL (ref 12–16)
IMMATURE GRANS (ABS): 0.01 THOU/MM3 (ref 0–0.07)
IMMATURE GRANULOCYTES: 0.3 %
LYMPHOCYTES # BLD: 28.7 %
LYMPHOCYTES ABSOLUTE: 0.9 THOU/MM3 (ref 1–4.8)
MCH RBC QN AUTO: 32.6 PG (ref 26–33)
MCHC RBC AUTO-ENTMCNC: 34.4 GM/DL (ref 32.2–35.5)
MCV RBC AUTO: 94.6 FL (ref 81–99)
MONOCYTES # BLD: 5.8 %
MONOCYTES ABSOLUTE: 0.2 THOU/MM3 (ref 0.4–1.3)
NUCLEATED RED BLOOD CELLS: 0 /100 WBC
OSMOLALITY CALCULATION: 267.4 MOSMOL/KG (ref 275–300)
PLATELET # BLD: 82 THOU/MM3 (ref 130–400)
PMV BLD AUTO: 11.8 FL (ref 9.4–12.4)
POTASSIUM REFLEX MAGNESIUM: 3.7 MEQ/L (ref 3.5–5.2)
PRO-BNP: 188 PG/ML (ref 0–900)
RBC # BLD: 4.27 MILL/MM3 (ref 4.2–5.4)
SEG NEUTROPHILS: 64.9 %
SEGMENTED NEUTROPHILS ABSOLUTE COUNT: 2.1 THOU/MM3 (ref 1.8–7.7)
SODIUM BLD-SCNC: 133 MEQ/L (ref 135–145)
TROPONIN T: < 0.01 NG/ML
WBC # BLD: 3.3 THOU/MM3 (ref 4.8–10.8)

## 2021-09-12 PROCEDURE — 96360 HYDRATION IV INFUSION INIT: CPT

## 2021-09-12 PROCEDURE — 6360000004 HC RX CONTRAST MEDICATION: Performed by: EMERGENCY MEDICINE

## 2021-09-12 PROCEDURE — 84484 ASSAY OF TROPONIN QUANT: CPT

## 2021-09-12 PROCEDURE — 2580000003 HC RX 258: Performed by: EMERGENCY MEDICINE

## 2021-09-12 PROCEDURE — 83880 ASSAY OF NATRIURETIC PEPTIDE: CPT

## 2021-09-12 PROCEDURE — 71275 CT ANGIOGRAPHY CHEST: CPT

## 2021-09-12 PROCEDURE — 93005 ELECTROCARDIOGRAM TRACING: CPT | Performed by: EMERGENCY MEDICINE

## 2021-09-12 PROCEDURE — 85025 COMPLETE CBC W/AUTO DIFF WBC: CPT

## 2021-09-12 PROCEDURE — 80048 BASIC METABOLIC PNL TOTAL CA: CPT

## 2021-09-12 PROCEDURE — 36415 COLL VENOUS BLD VENIPUNCTURE: CPT

## 2021-09-12 PROCEDURE — 96361 HYDRATE IV INFUSION ADD-ON: CPT

## 2021-09-12 PROCEDURE — 99284 EMERGENCY DEPT VISIT MOD MDM: CPT

## 2021-09-12 RX ORDER — 0.9 % SODIUM CHLORIDE 0.9 %
1000 INTRAVENOUS SOLUTION INTRAVENOUS ONCE
Status: COMPLETED | OUTPATIENT
Start: 2021-09-12 | End: 2021-09-12

## 2021-09-12 RX ORDER — RISPERIDONE 0.5 MG/1
0.5 TABLET, FILM COATED ORAL 2 TIMES DAILY
COMMUNITY
End: 2021-12-17

## 2021-09-12 RX ADMIN — IOPAMIDOL 80 ML: 755 INJECTION, SOLUTION INTRAVENOUS at 21:56

## 2021-09-12 RX ADMIN — SODIUM CHLORIDE 1000 ML: 9 INJECTION, SOLUTION INTRAVENOUS at 20:50

## 2021-09-12 ASSESSMENT — ENCOUNTER SYMPTOMS
EYE DISCHARGE: 0
CHEST TIGHTNESS: 0
NAUSEA: 0
VOICE CHANGE: 0
SORE THROAT: 0
EYE PAIN: 0
COUGH: 0
PHOTOPHOBIA: 0
SHORTNESS OF BREATH: 0
BACK PAIN: 0
EYE REDNESS: 0
WHEEZING: 0
TROUBLE SWALLOWING: 0
BLOOD IN STOOL: 0
VOMITING: 0
ABDOMINAL PAIN: 0
DIARRHEA: 0
SINUS PRESSURE: 0
ABDOMINAL DISTENTION: 0
RHINORRHEA: 0
CHOKING: 0
CONSTIPATION: 0
EYE ITCHING: 0

## 2021-09-12 NOTE — ED NOTES
Dr Nuria Amezcua at bedside at this time     Jossie Hussein, [place]  09/11/21 2034

## 2021-09-12 NOTE — ED TRIAGE NOTES
Pt report her heart rate feels \"all over the pace\". Pt denies dx of any irregular heart rhythms but has had episodes like this in the past. Pt denies having CP. Pt c/o SOB and lightheadedness.

## 2021-09-12 NOTE — ED PROVIDER NOTES
[facility] EMERGENCY DEPT      CHIEF COMPLAINT       Chief Complaint   Patient presents with    Positive For Covid-19       Nurses Notes reviewed and I agree except as noted in the HPI. HISTORY OF PRESENT ILLNESS    Kiesha Gonzales is a 62 y.o. female who presents with complaint of having tested positive for Covid 6 days ago, patient state that she has past history of palpitations, states that the palpitations got worse last few days. She denies chest pain, lightheadedness or syncope. Onset: Acute  Duration: Has been sick for 2 days  Timing: Persistent Covid symptoms  Location of Pain: No pain  Intesity/severity:   Modifying Factors: History of anxiety, history of heart palpitations  Relieved by;  Previous Episodes; Tx Before arrival: None  REVIEW OF SYSTEMS      Review of Systems   Constitutional: Negative for fever, chills, diaphoresis and fatigue. HENT: Negative for congestion, drooling, facial swelling and sore throat. Eyes: Negative for photophobia, pain and discharge. Respiratory: Negative for cough, shortness of breath, wheezing and stridor. Cardiovascular: Negative for chest pain,  leg swelling. Positive for heart palpitations  Gastrointestinal: Negative for abdominal pain, blood in stool and abdominal distention. Endocrine: Negative for cold intolerance, heat intolerance, polydipsia and polyuria. Genitourinary: Negative for dysuria, urgency, hematuria and difficulty urinating. Musculoskeletal: Negative for gait problem, neck pain and neck stiffness. Skin; No rash, No itching  Neurological: Negative for seizures, weakness and numbness. Hematological: Negative for adenopathy. Does not bruise/bleed easily. PAST MEDICAL HISTORY    has no past medical history on file. SURGICAL HISTORY      has a past surgical history that includes Tonsillectomy; Adenoidectomy;  Appendectomy; Dilation and curettage of uterus; Myringotomy Tympanostomy Tube Placement; Cataract removal (Right, 11/12/15); and Skin cancer excision (Right, 11/11/15). CURRENT MEDICATIONS       Discharge Medication List as of 9/11/2021  8:41 PM      CONTINUE these medications which have NOT CHANGED    Details   PARoxetine (PAXIL) 20 MG tablet TAKE 1 TABLET BY MOUTH EVERY DAY IN THE MORNING, Disp-90 tablet, R-1Normal      Biotin 10 MG CAPS Take by mouthHistorical Med      Turmeric 400 MG CAPS Take by mouthHistorical Med      pantoprazole (PROTONIX) 40 MG tablet Take 1 tablet by mouth every morning (before breakfast), Disp-30 [id number]      folic acid (FOLVITE) 723 MCG tablet Take 800 mcg by mouth dailyHistorical Med      vitamin B-6 (PYRIDOXINE) 100 MG tablet Take 150 mg by mouth dailyHistorical Med      Omega-3 Fatty Acids (FISH OIL) 1000 MG CAPS Take 3,000 mg by mouth dailyHistorical Med             ALLERGIES     has No Known Allergies. FAMILY HISTORY     She indicated that her mother is alive. She indicated that her father is alive. family history includes Heart Disease in her mother. SOCIAL HISTORY      reports that she has never smoked. She has never used smokeless tobacco. She reports that she does not drink alcohol and does not use drugs. PHYSICAL EXAM     INITIAL VITALS:  height is 5' 3\" (1.6 m) and weight is 140 lb (63.5 kg). Her oral temperature is 101.5 °F (38.6 °C). Her blood pressure is 116/56 (abnormal) and her pulse is 79. Her respiration is 18 and oxygen saturation is 96%. Physical Exam   Constitutional:  well-developed and well-nourished. HENT: Head: Normocephalic, atraumatic, Bilateral external ears normal, Oropharynx mosit, No oral exudates, Nose normal.   Eyes: PERRL, EOMI, Conjunctiva normal, No discharge. No scleral icterus  Neck: Normal range of motion, No tenderness, Supple  Cardiovascular: Normal rate, regular rhythm, S1 normal and S2 normal.  Exam reveals no gallop. Pulmonary/Chest: Effort normal and breath sounds normal. No accessory muscle usage or stridor.  No respiratory distress. no wheezes. has no rales. exhibits no tenderness. Abdominal: Soft. Bowel sounds are normal.  exhibits no distension. There is no tenderness. There is no rebound and no guarding. Extremities: No edema, no tenderness, no cyanosis, no clubbing. Musculoskeletal: Good range of motion in major joints is observed. No major deformities noted. Neurological: Alert and oriented ×3, normal motor function, normal sensory function, no focal deficits. GCS 15  Skin: Skin is warm, dry and intact. No rash noted. No erythema. Psychiatric: Affect normal, judgment normal, mood normal.  DIFFERENTIAL DIAGNOSIS:       DIAGNOSTIC RESULTS     EKG: All EKG's are interpreted by the Emergency Department Physician who either signs or Co-signs this chart in the absence of a cardiologist.      RADIOLOGY: non-plain film images(s) such as CT, Ultrasound and MRI are read by the radiologist.  Plain radiographic images are visualized and preliminarily interpreted by the emergency physician unless otherwise stated below.       LABS:   Labs Reviewed   CBC WITH AUTO DIFFERENTIAL - Abnormal; Notable for the following components:       Result Value    WBC 3.4 (*)     Platelets 94 (*)     Lymphocytes Absolute 0.9 (*)     Monocytes Absolute 0.2 (*)     All other components within normal limits   BASIC METABOLIC PANEL W/ REFLEX TO MG FOR LOW K - Abnormal; Notable for the following components:    Sodium 132 (*)     Chloride 95 (*)     All other components within normal limits   OSMOLALITY - Abnormal; Notable for the following components:    Osmolality Calc 265.2 (*)     All other components within normal limits   GLOMERULAR FILTRATION RATE, ESTIMATED - Abnormal; Notable for the following components:    Est, Glom Filt Rate 64 (*)     All other components within normal limits   ANION GAP   SCAN OF BLOOD SMEAR       EMERGENCY DEPARTMENT COURSE:   Vitals:    Vitals:    09/11/21 1907 09/11/21 1909 09/11/21 1926 09/11/21 2031   BP:  (!) 85/52 106/62 (!) 116/56   Pulse:   67 79   Resp:   16 18   Temp:       TempSrc:       SpO2: 95%  97% 96%   Weight:   140 lb (63.5 kg)    Height:   5' 3\" (1.6 m)      Saturating well on room air, greater than 96%, no work of breathing, clear lung sounds. Patient was given Tylenol for fever, IV fluids. Discharge home advised to follow-up with a primary care physician.     CRITICAL CARE:       CONSULTS:  None    PROCEDURES:  None    FINAL IMPRESSION      1. COVID-19 virus infection          DISPOSITION/PLAN   Decision To Discharge    PATIENT REFERRED TO:  Tien Machado MD  [address] [place] [address]    In 2 days  RE-CHECK AND FURTHER TESTING AS NEEDED      DISCHARGE MEDICATIONS:  Discharge Medication List as of 9/11/2021  8:41 PM          (Please note that portions of this note were completed with a voice recognition program.  Efforts were made to edit the dictations but occasionally words are mis-transcribed.)    DO Alex Johnson DO  09/13/21 0105

## 2021-09-13 ENCOUNTER — TELEPHONE (OUTPATIENT)
Dept: FAMILY MEDICINE CLINIC | Age: 61
End: 2021-09-13

## 2021-09-13 ENCOUNTER — CARE COORDINATION (OUTPATIENT)
Dept: CARE COORDINATION | Age: 61
End: 2021-09-13

## 2021-09-13 LAB
EKG ATRIAL RATE: 80 BPM
EKG ATRIAL RATE: 82 BPM
EKG P AXIS: 41 DEGREES
EKG P AXIS: 47 DEGREES
EKG P-R INTERVAL: 116 MS
EKG P-R INTERVAL: 130 MS
EKG Q-T INTERVAL: 328 MS
EKG Q-T INTERVAL: 362 MS
EKG QRS DURATION: 76 MS
EKG QRS DURATION: 78 MS
EKG QTC CALCULATION (BAZETT): 422 MS
EKG QTC CALCULATION (BAZETT): 425 MS
EKG R AXIS: 40 DEGREES
EKG R AXIS: 48 DEGREES
EKG T AXIS: 69 DEGREES
EKG T AXIS: 74 DEGREES
EKG VENTRICULAR RATE: 101 BPM
EKG VENTRICULAR RATE: 82 BPM

## 2021-09-13 NOTE — TELEPHONE ENCOUNTER
Pt called stating that she tested positive for Covid on 09/06/21. She went to the ER over the weekend for palpitations. They informed her she was in Afib. Pt is asking if she should be seen or if anything else needs to be down with her being in afib.     Lluvia Quintana may be reached at [phone]

## 2021-09-13 NOTE — ED PROVIDER NOTES
[facility]  eMERGENCY dEPARTMENT eNCOUnter          [address] [facility]       Chief Complaint   Patient presents with    Irregular Heart Beat    Positive For Covid-19     9/5    Dizziness       Nurses Notes reviewed and I agree except as noted in the HPI. HISTORY OF PRESENT ILLNESS    Ani Sharma is a 62 y.o. female who presents irregular heart rate. Apparently the patient has had this in the past.  She states her procedure was diagnosed with Covid that is started, more frequently. Today's lasted for hours. Patient states that she is drinking enough water. She states she is not overdoing it on caffeine or chocolate. She states that it came on this evening and it states that she decided to come in for further evaluation and treatment. Currently the patient is resting comfortably on cot no apparent distress. Patient denies any cardiac history. She states that in the past they have just been watching it. Patient has had no syncopal or presyncopal episodes. No chest pain or shortness of breath with exertion. REVIEW OF SYSTEMS     Review of Systems   Constitutional: Negative for activity change, appetite change, diaphoresis, fatigue and unexpected weight change. HENT: Negative for congestion, ear discharge, ear pain, hearing loss, rhinorrhea, sinus pressure, sore throat, trouble swallowing and voice change. Eyes: Negative for photophobia, pain, discharge, redness and itching. Respiratory: Negative for cough, choking, chest tightness, shortness of breath and wheezing. Cardiovascular: Positive for palpitations. Negative for chest pain and leg swelling. Gastrointestinal: Negative for abdominal distention, abdominal pain, blood in stool, constipation, diarrhea, nausea and vomiting. Endocrine: Negative for polydipsia, polyphagia and polyuria. Genitourinary: Negative for decreased urine volume, difficulty urinating, dysuria, enuresis, frequency, hematuria and urgency. Musculoskeletal: Negative for arthralgias, back pain, gait problem, myalgias, neck pain and neck stiffness. Skin: Negative for pallor and rash. Allergic/Immunologic: Negative for immunocompromised state. Neurological: Negative for dizziness, tremors, seizures, syncope, facial asymmetry, weakness, light-headedness, numbness and headaches. Hematological: Negative for adenopathy. Does not bruise/bleed easily. Psychiatric/Behavioral: Negative for agitation, hallucinations and suicidal ideas. The patient is not nervous/anxious. PAST MEDICAL HISTORY    has no past medical history on file. SURGICAL HISTORY      has a past surgical history that includes Tonsillectomy; Adenoidectomy; Appendectomy; Dilation and curettage of uterus; Myringotomy Tympanostomy Tube Placement; Cataract removal (Right, 11/12/15); and Skin cancer excision (Right, 11/11/15). CURRENT MEDICATIONS       Previous Medications    BIOTIN 10 MG CAPS    Take by mouth    FOLIC ACID (FOLVITE) 994 MCG TABLET    Take 800 mcg by mouth daily    OMEGA-3 FATTY ACIDS (FISH OIL) 1000 MG CAPS    Take 3,000 mg by mouth daily    PANTOPRAZOLE (PROTONIX) 40 MG TABLET    Take 1 tablet by mouth every morning (before breakfast)    PAROXETINE (PAXIL) 20 MG TABLET    TAKE 1 TABLET BY MOUTH EVERY DAY IN THE MORNING    RISPERIDONE (RISPERDAL) 0.5 MG TABLET    Take 0.5 mg by mouth 2 times daily    TURMERIC 400 MG CAPS    Take by mouth    VITAMIN B-6 (PYRIDOXINE) 100 MG TABLET    Take 150 mg by mouth daily       ALLERGIES     has No Known Allergies. FAMILY HISTORY     She indicated that her mother is alive. She indicated that her father is alive. family history includes Heart Disease in her mother. SOCIAL HISTORY      reports that she has never smoked. She has never used smokeless tobacco. She reports that she does not drink alcohol and does not use drugs.     PHYSICAL EXAM     INITIAL VITALS:  height is 5' 3\" (1.6 m) and weight is 140 lb (63.5 kg). Her oral temperature is 98 °F (36.7 °C). Her blood pressure is 108/76 and her pulse is 76. Her respiration is 28 and oxygen saturation is 94%. Physical Exam  Vitals and nursing note reviewed. Constitutional:       General: She is not in acute distress. Appearance: She is well-developed. She is not diaphoretic. HENT:      Head: Normocephalic and atraumatic. Right Ear: External ear normal.      Left Ear: External ear normal.      Nose: Nose normal.      Mouth/Throat:      Pharynx: No oropharyngeal exudate. Eyes:      General: No scleral icterus. Right eye: No discharge. Left eye: No discharge. Conjunctiva/sclera: Conjunctivae normal.      Pupils: Pupils are equal, round, and reactive to light. Neck:      Thyroid: No thyromegaly. Vascular: No JVD. Trachea: No tracheal deviation. Cardiovascular:      Rate and Rhythm: Normal rate. Rhythm irregular. Pulses:           Carotid pulses are 2+ on the right side and 2+ on the left side. Radial pulses are 2+ on the right side and 2+ on the left side. Femoral pulses are 2+ on the right side and 2+ on the left side. Popliteal pulses are 2+ on the right side and 2+ on the left side. Dorsalis pedis pulses are 2+ on the right side and 2+ on the left side. Posterior tibial pulses are 2+ on the right side and 2+ on the left side. Heart sounds: Normal heart sounds, S1 normal and S2 normal. No murmur heard. No friction rub. No gallop. Pulmonary:      Effort: Pulmonary effort is normal.      Breath sounds: No stridor. Examination of the right-lower field reveals decreased breath sounds. Examination of the left-lower field reveals decreased breath sounds. Decreased breath sounds present. No wheezing, rhonchi or rales. Chest:      Chest wall: No tenderness. Abdominal:      General: Bowel sounds are normal. There is no distension. Palpations: Abdomen is soft. There is no mass. Tenderness: There is no abdominal tenderness. There is no right CVA tenderness, left CVA tenderness, guarding or rebound. Negative signs include Acosta's sign, Rovsing's sign, McBurney's sign, psoas sign and obturator sign. Hernia: No hernia is present. Musculoskeletal:         General: No tenderness. Normal range of motion. Cervical back: Normal range of motion and neck supple. Right lower leg: No edema. Left lower leg: No edema. Lymphadenopathy:      Cervical: No cervical adenopathy. Skin:     General: Skin is warm and dry. Capillary Refill: Capillary refill takes less than 2 seconds. Findings: No bruising, ecchymosis, lesion or rash. Neurological:      General: No focal deficit present. Mental Status: She is alert and oriented to person, place, and time. Mental status is at baseline. Cranial Nerves: No cranial nerve deficit. Sensory: No sensory deficit. Motor: No weakness. Coordination: Coordination normal.      Gait: Gait normal.      Deep Tendon Reflexes: Reflexes are normal and symmetric. Reflexes normal.   Psychiatric:         Mood and Affect: Mood normal.         Speech: Speech normal.         Behavior: Behavior normal.         Thought Content: Thought content normal.         Judgment: Judgment normal.           DIFFERENTIAL DIAGNOSIS:   A. fib, PVCs, V. tach    DIAGNOSTIC RESULTS     EKG: All EKG's are interpreted by the Emergency Department Physician who either signs or Co-signs this chart in the absence of a cardiologist.  EKG reveals sinus rhythm frequent PVCs, ventricular rate 101 WA interval 130 QRS duration 78 QT interval 328 QTC of 425. Repeat EKG after fluids reveals a normal sinus rhythm with a normal axis, ventricular rate of 82 WA interval 116 QRS duration 76, QT interval 362 QTC of 422. RADIOLOGY: non-plain film images(s) such as CT, Ultrasound and MRI are read by the radiologist.  CTA [address]   Final Result   1. Multifocal lung opacities with trace right effusion. Consider    infectious etiology. 2. No acute pulmonary embolus. This document has been electronically signed by: Mukesh Kyle MD on 09/12/2021 11:02 PM      All CTs at this facility use dose modulation techniques and iterative    reconstructions, and/or weight-based dosing   when appropriate to reduce radiation to a low as reasonably achievable. LABS:   Labs Reviewed   BASIC METABOLIC PANEL W/ REFLEX TO MG FOR LOW K - Abnormal; Notable for the following components:       Result Value    Sodium 133 (*)     Glucose 121 (*)     All other components within normal limits   CBC WITH AUTO DIFFERENTIAL - Abnormal; Notable for the following components:    WBC 3.3 (*)     Platelets 82 (*)     Lymphocytes Absolute 0.9 (*)     Monocytes Absolute 0.2 (*)     All other components within normal limits   GLOMERULAR FILTRATION RATE, ESTIMATED - Abnormal; Notable for the following components:    Est, Glom Filt Rate 85 (*)     All other components within normal limits   OSMOLALITY - Abnormal; Notable for the following components:    Osmolality Calc 267.4 (*)     All other components within normal limits   BRAIN NATRIURETIC PEPTIDE   TROPONIN   ANION GAP       EMERGENCY DEPARTMENT COURSE:   Vitals:    Vitals:    09/12/21 1935 09/12/21 2051 09/12/21 2142 09/12/21 2250   BP: (!) 104/55 106/68 114/61 108/76   Pulse: 80 90 74 76   Resp: 26 21 27 28   Temp: 98 °F (36.7 °C)      TempSrc: Oral      SpO2: 94% 94% 94% 94%   Weight: 140 lb (63.5 kg)      Height: 5' 3\" (1.6 m)        Patient was assessed at bedside appropriate labs and imaging were ordered. CT of the chest was ordered. This is her third visit for shortness of breath and palpitations in the last 2 days. Patient has what appears to be PVCs on the EKG as well as the monitor. Patient was given fluids here. I reviewed all her labs and imaging all of which were apparently negative.   Her heart rate came down well with fluids. PVCs abated. This is most likely because of her illness and because of dehydration. Patient's GFR yesterday was 65 it is 85 today. She appears to be hydrating better but not quite well enough. She is instructed to stay away from any stimulants including any drugs with dextromethorphan and that she is also instructed to stay away from caffeine products. She is instructed to drink at least 8, 8 ounce glasses of water daily. She is instructed stay away from pop and drinks high in sugar. Patient is instructed to follow-up with the primary care physician to do so within the next 1 to 2 days and return to the emergency room immediately for any new or worsening complaints. This was discussed with the patient at bedside who understood and agreed with the plan. Patient is subsequently discharged home in stable condition. Patient has COVID-19. She also has what appears to be PVCs. This most likely secondary due to dehydration. Patient is instructed to drink at least 8, 8 ounce glasses of water daily. Patient is instructed to use symptomatic care for her COVID-19. Patient is instructed to watch any caffeine intake or stimulant intake as this may make her PVCs worse. Patient is instructed to stay well-hydrated take all her current medications as prescribed follow-up with a primary care physician and return to the nearest emergency room immediately for any new or worsening complaints. CRITICAL CARE:   None    CONSULTS:  None    PROCEDURES:  None    FINAL IMPRESSION      1. Premature ventricular contractions    2. COVID-19    3.  Dehydration          DISPOSITION/PLAN   Discharge    PATIENT REFERRED TO:  Lizbet Kirkland MD  [address] [place] [address] [facility]  [phone]    Call in 1 day        DISCHARGE MEDICATIONS:  New Prescriptions    No medications on file       (Please note that portions of this note were completed with a voice recognition program.  Efforts were made to edit the dictations but occasionally words are mis-transcribed.)    DO Darci Glassocent, DO  09/12/21 7798

## 2021-09-13 NOTE — ED NOTES
Pt ambulatory to bathroom with steady gait. No assistance needed.      Belem Goldman RN  09/12/21 [id number]

## 2021-09-13 NOTE — CARE COORDINATION
Attempted to reach patient for a ED follow up in regards to COVID-19 education/ monitoring. Patient was unavailable at the time of my call, and a generic voicemail message was left asking patient to return my call at [phone].     Jim Torres [facility]  [address] [facility]   Medication Assistance  [address] [facility] [address]    (C)[phone] ([phone]

## 2021-09-14 ENCOUNTER — TELEPHONE (OUTPATIENT)
Dept: FAMILY MEDICINE CLINIC | Age: 61
End: 2021-09-14

## 2021-09-14 DIAGNOSIS — R89.9 ABNORMAL LABORATORY TEST RESULT: Primary | ICD-10-CM

## 2021-09-14 NOTE — TELEPHONE ENCOUNTER
----- Message from Linn Aj MD sent at 9/13/2021  4:17 PM EDT -----  Please let her know that her platelets were low when she went to the hospital on 9/11/2021 and we need to repeat in 1 week a CBC

## 2021-09-15 ENCOUNTER — TELEPHONE (OUTPATIENT)
Dept: FAMILY MEDICINE CLINIC | Age: 61
End: 2021-09-15

## 2021-09-15 ENCOUNTER — VIRTUAL VISIT (OUTPATIENT)
Dept: FAMILY MEDICINE CLINIC | Age: 61
End: 2021-09-15

## 2021-09-15 DIAGNOSIS — N39.0 URINARY TRACT INFECTION WITHOUT HEMATURIA, SITE UNSPECIFIED: Primary | ICD-10-CM

## 2021-09-15 DIAGNOSIS — E87.1 HYPONATREMIA: ICD-10-CM

## 2021-09-15 PROCEDURE — 99213 OFFICE O/P EST LOW 20 MIN: CPT | Performed by: FAMILY MEDICINE

## 2021-09-15 RX ORDER — FINASTERIDE 5 MG/1
TABLET, FILM COATED ORAL
COMMUNITY
Start: 2021-08-16

## 2021-09-15 RX ORDER — NITROFURANTOIN 25; 75 MG/1; MG/1
100 CAPSULE ORAL 2 TIMES DAILY
Qty: 14 CAPSULE | Refills: 0 | Status: SHIPPED | OUTPATIENT
Start: 2021-09-15 | End: 2021-09-22

## 2021-09-15 ASSESSMENT — ENCOUNTER SYMPTOMS
SHORTNESS OF BREATH: 0
NAUSEA: 1
DIARRHEA: 0
CONSTIPATION: 0
EYES NEGATIVE: 1
COUGH: 0
VOMITING: 0
CHEST TIGHTNESS: 0
ABDOMINAL PAIN: 0

## 2021-09-15 NOTE — TELEPHONE ENCOUNTER
Verbal order Dr Jefe Ashley video appt preferred.     Video appt scheduled for this afternoon 4:00pm.

## 2021-09-15 NOTE — PROGRESS NOTES
Denver Moreno agreed to Video Chat in presence of Dr Violet Myles and myself. Verified who was present in room with Ana Laura. Ana Laura informed the e-mail address used to Face Time cannot be used to contact the Provider, if they are any questions or concerns they need to call the office directly. Denver Moreno stated understanding.      [facility]

## 2021-09-15 NOTE — TELEPHONE ENCOUNTER
Pt called said that she is having urinary frequency, also pressure when she urinates. Started yesterday. Also feeling nauseous with the UTI. Did not have nausea until the UTI started yesterday. Said that she is Covid positive, did an at home test on September 6th.     [facility] [employer] [facility] [employer] [place]

## 2021-09-24 ENCOUNTER — NURSE ONLY (OUTPATIENT)
Dept: LAB | Age: 61
End: 2021-09-24

## 2021-09-24 DIAGNOSIS — E55.9 VITAMIN D DEFICIENCY: ICD-10-CM

## 2021-09-24 DIAGNOSIS — N39.0 URINARY TRACT INFECTION WITHOUT HEMATURIA, SITE UNSPECIFIED: ICD-10-CM

## 2021-09-24 DIAGNOSIS — E87.1 HYPONATREMIA: ICD-10-CM

## 2021-09-24 DIAGNOSIS — R89.9 ABNORMAL LABORATORY TEST RESULT: ICD-10-CM

## 2021-09-24 LAB
ANION GAP SERPL CALCULATED.3IONS-SCNC: 8 MEQ/L (ref 8–16)
BACTERIA: ABNORMAL /HPF
BASOPHILS # BLD: 0.4 %
BASOPHILS ABSOLUTE: 0 THOU/MM3 (ref 0–0.1)
BILIRUBIN URINE: NEGATIVE
BLOOD, URINE: NEGATIVE
BUN BLDV-MCNC: 14 MG/DL (ref 7–22)
CALCIUM SERPL-MCNC: 9.4 MG/DL (ref 8.5–10.5)
CASTS 2: ABNORMAL /LPF
CASTS UA: ABNORMAL /LPF
CHARACTER, URINE: CLEAR
CHLORIDE BLD-SCNC: 104 MEQ/L (ref 98–111)
CO2: 27 MEQ/L (ref 23–33)
COLOR: YELLOW
CREAT SERPL-MCNC: 0.8 MG/DL (ref 0.4–1.2)
CRYSTALS, UA: ABNORMAL
EOSINOPHIL # BLD: 2.2 %
EOSINOPHILS ABSOLUTE: 0.2 THOU/MM3 (ref 0–0.4)
EPITHELIAL CELLS, UA: ABNORMAL /HPF
ERYTHROCYTE [DISTWIDTH] IN BLOOD BY AUTOMATED COUNT: 12.1 % (ref 11.5–14.5)
ERYTHROCYTE [DISTWIDTH] IN BLOOD BY AUTOMATED COUNT: 44 FL (ref 35–45)
GFR SERPL CREATININE-BSD FRML MDRD: 73 ML/MIN/1.73M2
GLUCOSE BLD-MCNC: 94 MG/DL (ref 70–108)
GLUCOSE URINE: NEGATIVE MG/DL
HCT VFR BLD CALC: 39.7 % (ref 37–47)
HEMOGLOBIN: 12.8 GM/DL (ref 12–16)
IMMATURE GRANS (ABS): 0.03 THOU/MM3 (ref 0–0.07)
IMMATURE GRANULOCYTES: 0.4 %
KETONES, URINE: NEGATIVE
LEUKOCYTE ESTERASE, URINE: ABNORMAL
LYMPHOCYTES # BLD: 29.4 %
LYMPHOCYTES ABSOLUTE: 2.1 THOU/MM3 (ref 1–4.8)
MCH RBC QN AUTO: 32 PG (ref 26–33)
MCHC RBC AUTO-ENTMCNC: 32.2 GM/DL (ref 32.2–35.5)
MCV RBC AUTO: 99.3 FL (ref 81–99)
MISCELLANEOUS 2: ABNORMAL
MONOCYTES # BLD: 7.1 %
MONOCYTES ABSOLUTE: 0.5 THOU/MM3 (ref 0.4–1.3)
NITRITE, URINE: NEGATIVE
NUCLEATED RED BLOOD CELLS: 0 /100 WBC
PH UA: 5 (ref 5–9)
PLATELET # BLD: 255 THOU/MM3 (ref 130–400)
PMV BLD AUTO: 10.4 FL (ref 9.4–12.4)
POTASSIUM SERPL-SCNC: 4 MEQ/L (ref 3.5–5.2)
PROTEIN UA: NEGATIVE
RBC # BLD: 4 MILL/MM3 (ref 4.2–5.4)
RBC URINE: ABNORMAL /HPF
RENAL EPITHELIAL, UA: ABNORMAL
SEG NEUTROPHILS: 60.5 %
SEGMENTED NEUTROPHILS ABSOLUTE COUNT: 4.2 THOU/MM3 (ref 1.8–7.7)
SODIUM BLD-SCNC: 139 MEQ/L (ref 135–145)
SPECIFIC GRAVITY, URINE: 1.02 (ref 1–1.03)
UROBILINOGEN, URINE: 1 EU/DL (ref 0–1)
VITAMIN D 25-HYDROXY: 38 NG/ML (ref 30–100)
WBC # BLD: 7 THOU/MM3 (ref 4.8–10.8)
WBC UA: ABNORMAL /HPF
YEAST: ABNORMAL

## 2021-09-26 LAB
ORGANISM: ABNORMAL
URINE CULTURE REFLEX: ABNORMAL

## 2021-09-27 ENCOUNTER — OFFICE VISIT (OUTPATIENT)
Dept: FAMILY MEDICINE CLINIC | Age: 61
End: 2021-09-27

## 2021-09-27 ENCOUNTER — TELEPHONE (OUTPATIENT)
Dept: FAMILY MEDICINE CLINIC | Age: 61
End: 2021-09-27

## 2021-09-27 VITALS
BODY MASS INDEX: 25.23 KG/M2 | SYSTOLIC BLOOD PRESSURE: 128 MMHG | WEIGHT: 142.38 LBS | DIASTOLIC BLOOD PRESSURE: 72 MMHG | HEIGHT: 63 IN | RESPIRATION RATE: 16 BRPM | HEART RATE: 88 BPM | TEMPERATURE: 96.8 F

## 2021-09-27 DIAGNOSIS — R21 SKIN RASH: ICD-10-CM

## 2021-09-27 DIAGNOSIS — N39.0 URINARY TRACT INFECTION WITHOUT HEMATURIA, SITE UNSPECIFIED: Primary | ICD-10-CM

## 2021-09-27 PROCEDURE — 99213 OFFICE O/P EST LOW 20 MIN: CPT | Performed by: FAMILY MEDICINE

## 2021-09-27 RX ORDER — PREDNISONE 10 MG/1
TABLET ORAL
Qty: 16 TABLET | Refills: 0 | Status: SHIPPED | OUTPATIENT
Start: 2021-09-27 | End: 2021-12-17

## 2021-09-27 RX ORDER — SULFAMETHOXAZOLE AND TRIMETHOPRIM 800; 160 MG/1; MG/1
1 TABLET ORAL 2 TIMES DAILY
Qty: 20 TABLET | Refills: 0 | Status: SHIPPED | OUTPATIENT
Start: 2021-09-27 | End: 2021-10-07

## 2021-09-27 ASSESSMENT — ENCOUNTER SYMPTOMS
DIARRHEA: 0
NAUSEA: 0
ABDOMINAL PAIN: 0
CONSTIPATION: 0
VOMITING: 0
CHEST TIGHTNESS: 0
COUGH: 0
SHORTNESS OF BREATH: 0
EYES NEGATIVE: 1

## 2021-09-27 NOTE — TELEPHONE ENCOUNTER
Call as uti still on 9-26-21 and to see dr Samia Wei on 9-27-21 . I did error as did not call in antibiotic and pharmacy closed .  Sent in  Bactrim to be picked up in am   please send to dr Samia Wei

## 2021-09-27 NOTE — PROGRESS NOTES
Date: 9/27/2021    Anabella Broussard is a 62 y.o. female who presents today for:  Chief Complaint   Patient presents with    Check-Up she had COVID at the beginning of the month and had palpitations. Now that is resolved. She also had a rash after she was Dx with COVID  And now it came back. She states that it is very itchy.  Urinary Tract Infection she thinks she has another infection. She had a U/A and blood work on Friday. HPI:     HPI    has a current medication list which includes the following prescription(s): sulfamethoxazole-trimethoprim, benfotiamine, collagen-antimicrobial, prednisone, finasteride, paroxetine, biotin, turmeric, folic acid, vitamin b-6, fish oil, and risperidone. No Known Allergies    Social History     Tobacco Use    Smoking status: Never Smoker    Smokeless tobacco: Never Used   Substance Use Topics    Alcohol use: No    Drug use: No       History reviewed. No pertinent past medical history. Past Surgical History:   Procedure Laterality Date    ADENOIDECTOMY      APPENDECTOMY      CATARACT REMOVAL Right 11/12/15    Dr Evi Schaeffer MYRINGOTOMY AND TYMPANOSTOMY TUBE PLACEMENT      SKIN CANCER EXCISION Right 11/11/15    shoulder, Dr Seu Crooked History   Problem Relation Age of Onset    Heart Disease Mother      Subjective:     Review of Systems   Constitutional: Negative for activity change, appetite change, diaphoresis, fatigue and fever. HENT: Negative. Eyes: Negative. Respiratory: Negative for cough, chest tightness and shortness of breath. Cardiovascular: Negative for chest pain, palpitations and leg swelling. Gastrointestinal: Negative for abdominal pain, constipation, diarrhea, nausea and vomiting. Genitourinary: Positive for dysuria, frequency and urgency. Musculoskeletal: Negative. Skin: Positive for rash.    Neurological: Negative for dizziness, syncope, weakness, light-headedness, numbness and headaches. Psychiatric/Behavioral: Negative.        :   /72 (Site: Right Upper Arm, Position: Sitting, Cuff Size: Medium Adult)   Pulse 88   Temp 96.8 °F (36 °C) (Skin)   Resp 16   Ht 5' 3\" (1.6 m)   Wt 142 lb 6 oz (64.6 kg)   BMI 25.22 kg/m²   Wt Readings from Last 3 Encounters:   09/27/21 142 lb 6 oz (64.6 kg)   09/12/21 140 lb (63.5 kg)   09/11/21 140 lb (63.5 kg)     Physical Exam  Vitals and nursing note reviewed. Constitutional:       General: She is not in acute distress. Appearance: She is well-developed. She is not diaphoretic. HENT:      Head: Normocephalic and atraumatic. Eyes:      General: No scleral icterus. Right eye: No discharge. Left eye: No discharge. Conjunctiva/sclera: Conjunctivae normal.      Pupils: Pupils are equal, round, and reactive to light. Neck:      Thyroid: No thyromegaly. Vascular: No JVD. Cardiovascular:      Rate and Rhythm: Normal rate and regular rhythm. Heart sounds: Normal heart sounds. No murmur heard. Pulmonary:      Effort: No respiratory distress. Breath sounds: Normal breath sounds. No wheezing, rhonchi or rales. Abdominal:      General: Bowel sounds are normal. There is no distension. Palpations: Abdomen is soft. There is no mass. Tenderness: There is no abdominal tenderness. There is no guarding or rebound. Musculoskeletal:         General: Normal range of motion. Cervical back: Normal range of motion and neck supple. Lymphadenopathy:      Cervical: No cervical adenopathy. Skin:     General: Skin is warm and dry. Findings: Rash present. Rash is macular and papular. Comments: She has a rash on her arms, abdomen, back and legs. She has several excoriations from her scratching herself. Neurological:      Mental Status: She is alert and oriented to person, place, and time.    Psychiatric:         Behavior: Behavior normal.       : Diagnosis Orders   1. Urinary tract infection without hematuria, site unspecified     2. Skin rash  predniSONE (DELTASONE) 10 MG tablet       :      Requested Prescriptions     Signed Prescriptions Disp Refills    predniSONE (DELTASONE) 10 MG tablet 16 tablet 0     Si tablets 2 times a day for 2 days, then 1 Tablet 2 times a day for 2 days, then 1 tablet daily till gone     Current Outpatient Medications   Medication Sig Dispense Refill    sulfamethoxazole-trimethoprim (BACTRIM DS) 800-160 MG per tablet Take 1 tablet by mouth 2 times daily for 10 days 20 tablet 0    Benfotiamine 150 MG CAPS       COLLAGEN-ANTIMICROBIAL EX       predniSONE (DELTASONE) 10 MG tablet 2 tablets 2 times a day for 2 days, then 1 Tablet 2 times a day for 2 days, then 1 tablet daily till gone 16 tablet 0    finasteride (PROSCAR) 5 MG tablet TAKE 1/2 TABLET BY MOUTH DAILY      PARoxetine (PAXIL) 20 MG tablet TAKE 1 TABLET BY MOUTH EVERY DAY IN THE MORNING 90 tablet 1    Biotin 10 MG CAPS Take by mouth      Turmeric 400 MG CAPS Take by mouth      folic acid (FOLVITE) 138 MCG tablet Take 800 mcg by mouth daily       vitamin B-6 (PYRIDOXINE) 100 MG tablet Take 150 mg by mouth daily      Omega-3 Fatty Acids (FISH OIL) 1000 MG CAPS Take 3,000 mg by mouth daily       risperiDONE (RISPERDAL) 0.5 MG tablet Take 0.5 mg by mouth 2 times daily (Patient not taking: Reported on 2021)       No current facility-administered medications for this visit. No orders of the defined types were placed in this encounter. Continue current medications. She will begin the Bactrim that was called by Dr. Werner Arellano yesterday    Return if symptoms worsen or fail to improve. Discussed use, benefit, and side effects of prescribed medications. All patient questions answered. Pt voiced understanding. Instructed to continue current medications,diet and exercise. Patient agreed with treatment plan.

## 2021-09-28 ENCOUNTER — TELEPHONE (OUTPATIENT)
Dept: FAMILY MEDICINE CLINIC | Age: 61
End: 2021-09-28

## 2021-09-28 DIAGNOSIS — N39.0 URINARY TRACT INFECTION WITHOUT HEMATURIA, SITE UNSPECIFIED: Primary | ICD-10-CM

## 2021-09-28 NOTE — TELEPHONE ENCOUNTER
Pt started taking the Bactrrim yesterday. Did both sets yesterday, ate with it both times. Sxs started this morning. Feeling shaky, heart seems to be beating faster and on edge. Hasnt took aything this morning yet because she is nervous from the current sxs. Would like advice on if she should stop and be given something else or try 1 pill twice a day instead of 2 ?

## 2021-09-28 NOTE — TELEPHONE ENCOUNTER
Please call the patient, patient may stop the steroid or prednisone and use over-the-counter cortisone 10 for the rashes.   Follow-up if no better

## 2021-12-15 ENCOUNTER — HOSPITAL ENCOUNTER (OUTPATIENT)
Dept: WOMENS IMAGING | Age: 61
Discharge: HOME | End: 2021-12-15
Payer: COMMERCIAL

## 2021-12-15 DIAGNOSIS — Z12.31 VISIT FOR SCREENING MAMMOGRAM: ICD-10-CM

## 2021-12-15 PROCEDURE — 77067 SCR MAMMO BI INCL CAD: CPT

## 2021-12-17 ENCOUNTER — VIRTUAL VISIT (OUTPATIENT)
Dept: FAMILY MEDICINE CLINIC | Age: 61
End: 2021-12-17

## 2021-12-17 DIAGNOSIS — J06.9 VIRAL URI: Primary | ICD-10-CM

## 2021-12-17 PROCEDURE — 99213 OFFICE O/P EST LOW 20 MIN: CPT | Performed by: FAMILY MEDICINE

## 2021-12-17 RX ORDER — AMOXICILLIN 500 MG/1
500 CAPSULE ORAL 3 TIMES DAILY
Qty: 30 CAPSULE | Refills: 0 | Status: SHIPPED | OUTPATIENT
Start: 2021-12-17 | End: 2021-12-27

## 2021-12-17 ASSESSMENT — ENCOUNTER SYMPTOMS
CONSTIPATION: 0
SORE THROAT: 1
SHORTNESS OF BREATH: 0
DIARRHEA: 0
EYES NEGATIVE: 1
CHEST TIGHTNESS: 0
RHINORRHEA: 1
VOMITING: 0
ABDOMINAL PAIN: 0
NAUSEA: 0
COUGH: 1

## 2021-12-17 NOTE — PROGRESS NOTES
Miguel Hernandez agreed to Video Chat/Exam in presence of Dr Lien Morales and myself. Verified who was present in room with Ana Laura. Ana Laura informed the e-mail address used to [facility] cannot be used to contact the Provider, if they are any questions or concerns they need to call the office directly. Miguel Hernandez stated understanding.

## 2021-12-17 NOTE — PROGRESS NOTES
Isidro Orosco (:  1960) is a 62 y.o. female,Established patient, here for evaluation of the following chief complaint(s): Sinus Problem (symptoms started yesterday ) She states that there is a lot of people sick at work but no COVID and she states that they were tested. She had COVID in September of this year. Patient verified Video Chat was not encrypted, and agrees to the Video Exam in presence of nurse. Patient was at : work  Present in the room was: alone       ASSESSMENT/PLAN:  1. Viral URI      Return if symptoms worsen or fail to improve. SUBJECTIVE/OBJECTIVE:  HPI    Review of Systems   Constitutional: Negative for activity change, appetite change, diaphoresis, fatigue and fever. HENT: Positive for congestion, ear discharge (left ear feels plugged), postnasal drip, rhinorrhea and sore throat. No problems with her sense of smell or taste. Eyes: Negative. Respiratory: Positive for cough (every now and then). Negative for chest tightness and shortness of breath. Cardiovascular: Negative for chest pain, palpitations and leg swelling. Gastrointestinal: Negative for abdominal pain, constipation, diarrhea, nausea and vomiting. Genitourinary: Negative. Musculoskeletal: Negative. Negative for myalgias. Skin: Negative. Negative for rash. Neurological: Negative for dizziness, syncope, weakness, light-headedness, numbness and headaches. Psychiatric/Behavioral: Negative. No flowsheet data found.      Physical Exam    [INSTRUCTIONS:  \"[x]\" Indicates a positive item  \"[]\" Indicates a negative item  -- DELETE ALL ITEMS NOT EXAMINED]    Constitutional: [x] Appears well-developed and well-nourished [x] No apparent distress      [] Abnormal -     Mental status: [x] Alert and awake  [x] Oriented to person/place/time [x] Able to follow commands    [] Abnormal -     Eyes:   EOM    [x]  Normal    [] Abnormal -   Sclera  [x]  Normal    [] Abnormal -          Discharge [x]  None visible   [] Abnormal -     HENT: [x] Normocephalic, atraumatic  [] Abnormal -   [x] Mouth/Throat: Mucous membranes are moist    External Ears [x] Normal  [] Abnormal -    Neck: [x] No visualized mass [] Abnormal -     Pulmonary/Chest: [x] Respiratory effort normal   [x] No visualized signs of difficulty breathing or respiratory distress        [] Abnormal -      Musculoskeletal:   [x] Normal gait with no signs of ataxia         [x] Normal range of motion of neck        [] Abnormal -     Neurological:        [x] No Facial Asymmetry (Cranial nerve 7 motor function) (limited exam due to video visit)          [x] No gaze palsy        [] Abnormal -          Skin:        [x] No significant exanthematous lesions or discoloration noted on facial skin         [] Abnormal -            Psychiatric:       [x] Normal Affect [] Abnormal -        [x] No Hallucinations    Other pertinent observable physical exam findings:-    She is concerned about getting a sinus infection because this is the way she usually feels this way when she has one. She want to have the Rx . On this date 12/17/2021 I have spent 15 minutes reviewing previous notes, test results and face to face (virtual) with the patient discussing the diagnosis and importance of compliance with the treatment plan as well as documenting on the day of the visit. Shan Lane was evaluated through a synchronous (real-time) audio-video encounter. The patient (or guardian if applicable) is aware that this is a billable service. Verbal consent to proceed has been obtained within the past 12 months. The visit was conducted pursuant to the emergency declaration under the [address] [place] [address] authority and the [employer] General Act. Patient identification was verified, and a caregiver was present when appropriate.  The patient was located in a state where the provider was credentialed to provide care. An electronic signature was used to authenticate this note.     --Marnie Young MD

## 2022-01-04 ENCOUNTER — TELEPHONE (OUTPATIENT)
Dept: FAMILY MEDICINE CLINIC | Age: 62
End: 2022-01-04

## 2022-01-04 DIAGNOSIS — J06.9 UPPER RESPIRATORY TRACT INFECTION, UNSPECIFIED TYPE: Primary | ICD-10-CM

## 2022-01-04 NOTE — TELEPHONE ENCOUNTER
Pt called req ATB for sinus infection. Pt is still  having headache,cough,sinus congestion, thick mucus,sore throat from drainage. Pt just had a positive covid in Sept \"2021\" per pt. Pt did a video appt for this 12-17-21 per pt.     [facility]

## 2022-01-15 ENCOUNTER — HOSPITAL ENCOUNTER (EMERGENCY)
Age: 62
Discharge: HOME | End: 2022-01-15
Payer: COMMERCIAL

## 2022-01-15 VITALS
OXYGEN SATURATION: 96 % | HEART RATE: 78 BPM | SYSTOLIC BLOOD PRESSURE: 137 MMHG | TEMPERATURE: 98 F | BODY MASS INDEX: 24.8 KG/M2 | RESPIRATION RATE: 18 BRPM | DIASTOLIC BLOOD PRESSURE: 63 MMHG | WEIGHT: 140 LBS

## 2022-01-15 DIAGNOSIS — H66.92 LEFT OTITIS MEDIA, UNSPECIFIED OTITIS MEDIA TYPE: ICD-10-CM

## 2022-01-15 DIAGNOSIS — J01.01 ACUTE RECURRENT MAXILLARY SINUSITIS: Primary | ICD-10-CM

## 2022-01-15 PROCEDURE — 99213 OFFICE O/P EST LOW 20 MIN: CPT | Performed by: EMERGENCY MEDICINE

## 2022-01-15 PROCEDURE — 99213 OFFICE O/P EST LOW 20 MIN: CPT

## 2022-01-15 RX ORDER — FLUTICASONE PROPIONATE 50 MCG
1 SPRAY, SUSPENSION (ML) NASAL DAILY
Qty: 16 G | Refills: 0 | Status: SHIPPED | OUTPATIENT
Start: 2022-01-15

## 2022-01-15 RX ORDER — AMOXICILLIN AND CLAVULANATE POTASSIUM 875; 125 MG/1; MG/1
1 TABLET, FILM COATED ORAL 2 TIMES DAILY
Qty: 20 TABLET | Refills: 0 | Status: SHIPPED | OUTPATIENT
Start: 2022-01-15 | End: 2022-01-25

## 2022-01-15 ASSESSMENT — PAIN - FUNCTIONAL ASSESSMENT: PAIN_FUNCTIONAL_ASSESSMENT: PREVENTS OR INTERFERES SOME ACTIVE ACTIVITIES AND ADLS

## 2022-01-15 ASSESSMENT — PAIN DESCRIPTION - LOCATION: LOCATION: EAR

## 2022-01-15 ASSESSMENT — ENCOUNTER SYMPTOMS
COUGH: 0
SORE THROAT: 0
SINUS PRESSURE: 1
SHORTNESS OF BREATH: 0
SINUS PAIN: 1

## 2022-01-15 ASSESSMENT — PAIN DESCRIPTION - PAIN TYPE: TYPE: ACUTE PAIN

## 2022-01-15 ASSESSMENT — PAIN DESCRIPTION - DESCRIPTORS: DESCRIPTORS: ACHING

## 2022-01-15 ASSESSMENT — PAIN DESCRIPTION - PROGRESSION: CLINICAL_PROGRESSION: GRADUALLY WORSENING

## 2022-01-15 ASSESSMENT — PAIN DESCRIPTION - ORIENTATION: ORIENTATION: LEFT

## 2022-01-15 ASSESSMENT — PAIN DESCRIPTION - FREQUENCY: FREQUENCY: CONTINUOUS

## 2022-01-15 ASSESSMENT — PAIN SCALES - GENERAL: PAINLEVEL_OUTOF10: 1

## 2022-01-15 NOTE — ED PROVIDER NOTES
[facility]       CHIEF COMPLAINT       Chief Complaint   Patient presents with    Head Congestion    Otalgia     left       Nurses Notes reviewed and I agree except as noted in the HPI. HISTORY OF PRESENT ILLNESS   Tresea Holstein is a 62 y.o. female who presents with complaints of left maxillary sinus pain and pressure. Left ear pain and pressure. Symptoms increasing over 4 days. Patient states yesterday her ear significantly began hurting. Patient has had multiple issues with tubes in her ears and has had ear drum perforations in the past.    Patient had COVID on January 3 of this year. HPI    REVIEW OF SYSTEMS     Review of Systems   Constitutional: Negative for fatigue and fever. HENT: Positive for ear pain, sinus pressure and sinus pain. Negative for ear discharge and sore throat. Respiratory: Negative for cough and shortness of breath. Cardiovascular: Negative for chest pain. Neurological: Negative for dizziness and headaches. Psychiatric/Behavioral: Negative for behavioral problems. PAST MEDICAL HISTORY   History reviewed. No pertinent past medical history. SURGICALHISTORY     Patient  has a past surgical history that includes Tonsillectomy; Adenoidectomy; Appendectomy; Dilation and curettage of uterus; Myringotomy Tympanostomy Tube Placement; Cataract removal (Right, 11/12/15); and Skin cancer excision (Right, 11/11/15).     CURRENT MEDICATIONS       Discharge Medication List as of 1/15/2022  8:28 AM      CONTINUE these medications which have NOT CHANGED    Details   Benfotiamine 150 MG CAPS Historical Med      COLLAGEN-ANTIMICROBIAL EX Historical Med      finasteride (PROSCAR) 5 MG tablet TAKE 1/2 TABLET BY MOUTH DAILYHistorical Med      PARoxetine (PAXIL) 20 MG tablet TAKE 1 TABLET BY MOUTH EVERY DAY IN THE MORNING, Disp-90 tablet, R-1Normal      Biotin 10 MG CAPS Take by mouthHistorical Med      Turmeric 400 MG CAPS Take by mouthHistorical Med      vitamin B-6 (PYRIDOXINE) 100 MG tablet Take 150 mg by mouth dailyHistorical Med             ALLERGIES     Patient is has No Known Allergies. Patients   Immunization History   Administered Date(s) Administered    Tdap (Boostrix, Adacel) 12/07/2016       FAMILY HISTORY     Patient's family history includes Heart Disease in her mother. SOCIAL HISTORY     Patient  reports that she has never smoked. She has never used smokeless tobacco. She reports that she does not drink alcohol and does not use drugs. PHYSICAL EXAM     ED TRIAGE VITALS  BP: 137/63, Temp: 98 °F (36.7 °C), Pulse: 78, Resp: 18, SpO2: 96 %,Estimated body mass index is 24.8 kg/m² as calculated from the following:    Height as of 9/27/21: 5' 3\" (1.6 m). Weight as of this encounter: 140 lb (63.5 kg). ,Patient's last menstrual period was 08/13/2015. Physical Exam  Constitutional:       General: She is not in acute distress. HENT:      Head: Normocephalic. Right Ear: Tympanic membrane is scarred. Left Ear: Tenderness present. A middle ear effusion is present. Tympanic membrane is scarred and erythematous. Nose:      Left Sinus: Maxillary sinus tenderness present. Cardiovascular:      Rate and Rhythm: Normal rate and regular rhythm. Pulses: Normal pulses. Heart sounds: Normal heart sounds. Pulmonary:      Effort: Pulmonary effort is normal.      Breath sounds: Normal breath sounds. Skin:     General: Skin is warm. Capillary Refill: Capillary refill takes less than 2 seconds. Neurological:      General: No focal deficit present. Mental Status: She is alert. Psychiatric:         Mood and Affect: Mood normal.         Behavior: Behavior normal.         DIAGNOSTIC RESULTS     Labs:No results found for this visit on 01/15/22.     IMAGING:    No orders to display         EKG:      URGENT CARE COURSE:     Vitals:    01/15/22 0814   BP: 137/63   Pulse: 78   Resp: 18   Temp: 98 °F (36.7 °C)   TempSrc: Temporal   SpO2: 96%   Weight: 140 lb (63.5 kg)       Medications - No data to display         PROCEDURES:  None    FINAL IMPRESSION      1. Acute recurrent maxillary sinusitis    2. Left otitis media, unspecified otitis media type          DISPOSITION/ PLAN     Present for what appears to be recurrent maxillary sinusitis, otitis media. Will treat with Flonase, Augmentin. Patient will use Sudafed. Drink plenty fluids. Follow-up primary care provider if no improvement 5 to 7 days.   Return if worse      PATIENT REFERRED TO:  Percy Poole MD  [address] [place] [id number]      DISCHARGE MEDICATIONS:  Discharge Medication List as of 1/15/2022  8:28 AM      START taking these medications    Details   amoxicillin-clavulanate (AUGMENTIN) 875-125 MG per tablet Take 1 tablet by mouth 2 times daily for 10 days, Disp-20 tablet, R-0Normal      fluticasone (FLONASE) 50 MCG/ACT nasal spray 1 spray by Each Nostril route daily, Disp-16 g, R-0Normal             Discharge Medication List as of 1/15/2022  8:28 AM          Discharge Medication List as of 1/15/2022  8:28 AM          FUNMI Solano CNP    (Please note that portions of this note were completed with a voice recognition program. Efforts were made to edit the dictations but occasionally words are mis-transcribed.)          FUNMI Solano CNP  01/15/22  [place] FUNMI Sanders CNP  01/15/22 1000

## 2022-01-15 NOTE — ED TRIAGE NOTES
Patient to room with c/o left head congestion and left ear pain beginning three days ago. States positive for COVID 01/02/22.

## 2022-08-31 RX ORDER — PAROXETINE HYDROCHLORIDE 20 MG/1
TABLET, FILM COATED ORAL
Qty: 90 TABLET | Refills: 0 | Status: SHIPPED | OUTPATIENT
Start: 2022-08-31 | End: 2022-11-25

## 2022-08-31 NOTE — TELEPHONE ENCOUNTER
The pharmacy is requesting a refill of the below medication which has been pended for you:     Requested Prescriptions     Pending Prescriptions Disp Refills    PARoxetine (PAXIL) 20 MG tablet [Pharmacy Med Name: PAROXETINE HCL 20 MG TABLET] 90 tablet 1     Sig: TAKE 1 TABLET BY MOUTH EVERY DAY IN THE MORNING       Last Appointment Date: 12/17/2021  Next Appointment Date: Visit date not found    No Known Allergies

## 2022-09-30 NOTE — PROGRESS NOTES
Zion Smith (:  1960) is a 62 y.o. female,Established patient, here for evaluation of the following chief complaint(s): Urinary Tract Infection and Urinary Frequency  She was Dx with COVID 2021       ASSESSMENT/PLAN:  1. Urinary tract infection without hematuria, site unspecified  -     Urinalysis Reflex to Culture; Future  2. Hyponatremia  -     Basic Metabolic Panel; Future      Return if symptoms worsen or fail to improve. SUBJECTIVE/OBJECTIVE:  HPI    Review of Systems   Constitutional: Positive for fever (last time she had a fever was 2 days ago. ). Negative for activity change, appetite change, diaphoresis and fatigue. HENT: Negative. Eyes: Negative. Respiratory: Negative for cough, chest tightness and shortness of breath. Cardiovascular: Negative for chest pain, palpitations and leg swelling. Gastrointestinal: Positive for nausea. Negative for abdominal pain, constipation, diarrhea and vomiting. Genitourinary: Positive for urgency. Negative for dysuria, frequency, hematuria, vaginal bleeding, vaginal discharge and vaginal pain. She feels pressure when she urinates and when she urinated it is a little bit. Musculoskeletal: Negative. Skin: Negative. Negative for rash. Neurological: Negative for dizziness, syncope, weakness, light-headedness, numbness and headaches. Psychiatric/Behavioral: Negative. No flowsheet data found.      Physical Exam    [INSTRUCTIONS:  \"[x]\" Indicates a positive item  \"[]\" Indicates a negative item  -- DELETE ALL ITEMS NOT EXAMINED]    Constitutional: [x] Appears well-developed and well-nourished [x] No apparent distress      [] Abnormal -     Mental status: [x] Alert and awake  [x] Oriented to person/place/time [x] Able to follow commands    [] Abnormal -     Eyes:   EOM    [x]  Normal    [] Abnormal -   Sclera  [x]  Normal    [] Abnormal -          Discharge [x]  None visible   [] Abnormal -     HENT: [x] Normocephalic, atraumatic  [] Abnormal -   [x] Mouth/Throat: Mucous membranes are moist    External Ears [x] Normal  [] Abnormal -    Neck: [x] No visualized mass [] Abnormal -     Pulmonary/Chest: [x] Respiratory effort normal   [x] No visualized signs of difficulty breathing or respiratory distress        [] Abnormal -      Musculoskeletal:   [x] Normal gait with no signs of ataxia         [x] Normal range of motion of neck        [] Abnormal -     Neurological:        [x] No Facial Asymmetry (Cranial nerve 7 motor function) (limited exam due to video visit)          [x] No gaze palsy        [] Abnormal -          Skin:        [x] No significant exanthematous lesions or discoloration noted on facial skin         [] Abnormal -            Psychiatric:       [x] Normal Affect [] Abnormal -        [x] No Hallucinations    Other pertinent observable physical exam findings:-          On this date 9/15/2021 I have spent 15 minutes reviewing previous notes, test results and face to face (virtual) with the patient discussing the diagnosis and importance of compliance with the treatment plan as well as documenting on the day of the visit. Dionicio Ramires was evaluated through a synchronous (real-time) audio-video encounter. The patient (or guardian if applicable) is aware that this is a billable service. Verbal consent to proceed has been obtained within the past 12 months. The visit was conducted pursuant to the emergency declaration under the [address] [place] [address] and the [employer] General Act. Patient identification was verified, and a caregiver was present when appropriate. The patient was located in a state where the provider was credentialed to provide care. An electronic signature was used to authenticate this note.     --Jose Kimball MD Discharged

## 2022-11-25 RX ORDER — PAROXETINE HYDROCHLORIDE 20 MG/1
TABLET, FILM COATED ORAL
Qty: 90 TABLET | Refills: 0 | Status: SHIPPED | OUTPATIENT
Start: 2022-11-25

## 2022-12-08 ENCOUNTER — PROCEDURE VISIT (OUTPATIENT)
Dept: NEUROLOGY | Age: 62
End: 2022-12-08
Payer: COMMERCIAL

## 2022-12-08 DIAGNOSIS — G56.03 BILATERAL CARPAL TUNNEL SYNDROME: Primary | ICD-10-CM

## 2022-12-08 DIAGNOSIS — M54.12 CERVICAL RADICULOPATHY: ICD-10-CM

## 2022-12-08 DIAGNOSIS — R20.0 BILATERAL HAND NUMBNESS: ICD-10-CM

## 2022-12-08 PROCEDURE — 95886 MUSC TEST DONE W/N TEST COMP: CPT | Performed by: PSYCHIATRY & NEUROLOGY

## 2022-12-08 PROCEDURE — 95911 NRV CNDJ TEST 9-10 STUDIES: CPT | Performed by: PSYCHIATRY & NEUROLOGY

## 2022-12-19 ENCOUNTER — HOSPITAL ENCOUNTER (OUTPATIENT)
Dept: WOMENS IMAGING | Age: 62
Discharge: HOME | End: 2022-12-19
Payer: COMMERCIAL

## 2022-12-19 DIAGNOSIS — Z12.31 VISIT FOR SCREENING MAMMOGRAM: ICD-10-CM

## 2022-12-19 PROCEDURE — 77067 SCR MAMMO BI INCL CAD: CPT

## 2023-02-24 RX ORDER — PAROXETINE HYDROCHLORIDE 20 MG/1
TABLET, FILM COATED ORAL
Qty: 30 TABLET | Refills: 0 | Status: SHIPPED | OUTPATIENT
Start: 2023-02-24 | End: 2023-03-02 | Stop reason: SDUPTHER

## 2023-03-02 RX ORDER — PAROXETINE HYDROCHLORIDE 20 MG/1
TABLET, FILM COATED ORAL
Qty: 30 TABLET | Refills: 0 | Status: SHIPPED | OUTPATIENT
Start: 2023-03-02

## 2023-03-02 NOTE — TELEPHONE ENCOUNTER
The pharmacy is  requesting a refill of the below medication which has been pended for you:     Requested Prescriptions     Pending Prescriptions Disp Refills    PARoxetine (PAXIL) 20 MG tablet 90 tablet 1     Sig: TAKE 1 TABLET BY MOUTH EVERY DAY IN THE MORNING       Last Appointment Date: 12/17/2021  Next Appointment Date: Visit date not found    No Known Allergies

## 2023-03-02 NOTE — TELEPHONE ENCOUNTER
Please let her know that we can only give her 30 day supply as she has not been in the office personally since 9/27/2021.

## 2023-08-08 ENCOUNTER — NURSE ONLY (OUTPATIENT)
Dept: LAB | Age: 63
End: 2023-08-08

## 2023-08-14 LAB — CYTOLOGY THIN PREP PAP: NORMAL

## 2023-10-02 RX ORDER — PAROXETINE HYDROCHLORIDE 20 MG/1
TABLET, FILM COATED ORAL
Qty: 90 TABLET | Refills: 0 | Status: SHIPPED | OUTPATIENT
Start: 2023-10-02

## 2023-10-02 NOTE — TELEPHONE ENCOUNTER
The pharmacy is  requesting a refill of the below medication which has been pended for you:     Requested Prescriptions     Pending Prescriptions Disp Refills    PARoxetine (PAXIL) 20 MG tablet [Pharmacy Med Name: PAROXETINE HCL 20 MG TABLET] 90 tablet 1     Sig: TAKE 1 TABLET BY MOUTH EVERY DAY IN THE MORNING       Last Appointment Date: 6/14/2023  Next Appointment Date: Visit date not found    No Known Allergies

## 2023-10-23 ENCOUNTER — OFFICE VISIT (OUTPATIENT)
Dept: FAMILY MEDICINE CLINIC | Age: 63
End: 2023-10-23

## 2023-10-23 VITALS
BODY MASS INDEX: 27.13 KG/M2 | WEIGHT: 153.13 LBS | DIASTOLIC BLOOD PRESSURE: 82 MMHG | HEART RATE: 84 BPM | HEIGHT: 63 IN | SYSTOLIC BLOOD PRESSURE: 122 MMHG | RESPIRATION RATE: 16 BRPM

## 2023-10-23 DIAGNOSIS — E55.9 VITAMIN D DEFICIENCY: ICD-10-CM

## 2023-10-23 DIAGNOSIS — I49.3 PVC (PREMATURE VENTRICULAR CONTRACTION): ICD-10-CM

## 2023-10-23 DIAGNOSIS — R00.2 PALPITATIONS: ICD-10-CM

## 2023-10-23 DIAGNOSIS — F41.9 ANXIETY: ICD-10-CM

## 2023-10-23 RX ORDER — DUTASTERIDE 0.5 MG/1
0.5 CAPSULE, LIQUID FILLED ORAL DAILY
COMMUNITY

## 2023-10-23 NOTE — PROGRESS NOTES
Pt is scheduled for a 48 hr Holter on 10-31-23 at [facility] at 315 pm and will need to arrive at 3 pm in  the [address] [place] [address] [facility]       Referral to Dr Lantigua faxed along with ofc notes, insurance cards and demographics.

## 2023-10-23 NOTE — PROGRESS NOTES
Date: 10/23/2023    Ana Laura Daniels is a 63 y.o. female who presents today for:  Chief Complaint   Patient presents with    Palpitations she has Hx of palpitations for a long time but in the last months they are tayler frequent even more than she ususally has. She was on Hydroxycloroquine and Minoxidil for her alopecia areata and she thought it was from that and she called her dermatologist and he discontinued her hydroxycloroquine and then her Monoxidil. She states that she was ok for a couple of weeks and not they started again. She denies feeling stressed or worried. She states that she has flutters on and off last a very short time on and off very frequent.        HPI:     Palpitations   This is a chronic problem. The current episode started more than 1 year ago. The problem occurs intermittently. The problem has been gradually worsening. Nothing aggravates the symptoms. Associated symptoms include dizziness and shortness of breath. Pertinent negatives include no anxiety, chest fullness, chest pain, coughing, diaphoresis, fever, nausea, near-syncope, numbness, vomiting or weakness. She has tried nothing for the symptoms. Risk factors include post menopause.       has a current medication list which includes the following prescription(s): dutasteride, paroxetine, b complex vitamins, vitamin d3, fluticasone, collagen-antimicrobial, and biotin.    No Known Allergies    Social History     Tobacco Use    Smoking status: Never    Smokeless tobacco: Never   Substance Use Topics    Alcohol use: No    Drug use: No       History reviewed. No pertinent past medical history.    Past Surgical History:   Procedure Laterality Date    ADENOIDECTOMY      APPENDECTOMY      CATARACT REMOVAL Right 11/12/15    Dr Morales    DILATION AND CURETTAGE OF UTERUS      MYRINGOTOMY AND TYMPANOSTOMY TUBE PLACEMENT      SKIN CANCER EXCISION Right 11/11/15    shoulder, Dr Sanz    TONSILLECTOMY         Family History   Problem Relation Age of

## 2023-10-25 ENCOUNTER — NURSE ONLY (OUTPATIENT)
Dept: LAB | Age: 63
End: 2023-10-25

## 2023-10-25 ENCOUNTER — TELEPHONE (OUTPATIENT)
Dept: FAMILY MEDICINE CLINIC | Age: 63
End: 2023-10-25

## 2023-10-25 DIAGNOSIS — E55.9 VITAMIN D DEFICIENCY: ICD-10-CM

## 2023-10-25 DIAGNOSIS — E78.49 OTHER HYPERLIPIDEMIA: ICD-10-CM

## 2023-10-25 DIAGNOSIS — N39.0 URINARY TRACT INFECTION WITHOUT HEMATURIA, SITE UNSPECIFIED: ICD-10-CM

## 2023-10-25 DIAGNOSIS — R00.2 PALPITATIONS: ICD-10-CM

## 2023-10-25 LAB
25(OH)D3 SERPL-MCNC: 46 NG/ML (ref 30–100)
ALBUMIN SERPL BCG-MCNC: 4 G/DL (ref 3.5–5.1)
ALP SERPL-CCNC: 84 U/L (ref 38–126)
ALT SERPL W/O P-5'-P-CCNC: 13 U/L (ref 11–66)
ANION GAP SERPL CALC-SCNC: 9 MEQ/L (ref 8–16)
AST SERPL-CCNC: 21 U/L (ref 5–40)
BASOPHILS ABSOLUTE: 0 THOU/MM3 (ref 0–0.1)
BASOPHILS NFR BLD AUTO: 0.9 %
BILIRUB SERPL-MCNC: 0.3 MG/DL (ref 0.3–1.2)
BUN SERPL-MCNC: 17 MG/DL (ref 7–22)
CALCIUM SERPL-MCNC: 9.2 MG/DL (ref 8.5–10.5)
CHLORIDE SERPL-SCNC: 107 MEQ/L (ref 98–111)
CHOLEST SERPL-MCNC: 237 MG/DL (ref 100–199)
CO2 SERPL-SCNC: 29 MEQ/L (ref 23–33)
CREAT SERPL-MCNC: 0.7 MG/DL (ref 0.4–1.2)
DEPRECATED RDW RBC AUTO: 44.6 FL (ref 35–45)
EOSINOPHIL NFR BLD AUTO: 2.1 %
EOSINOPHILS ABSOLUTE: 0.1 THOU/MM3 (ref 0–0.4)
ERYTHROCYTE [DISTWIDTH] IN BLOOD BY AUTOMATED COUNT: 12.2 % (ref 11.5–14.5)
GFR SERPL CREATININE-BSD FRML MDRD: > 60 ML/MIN/1.73M2
GLUCOSE SERPL-MCNC: 84 MG/DL (ref 70–108)
HCT VFR BLD AUTO: 43 % (ref 37–47)
HDLC SERPL-MCNC: 72 MG/DL
HGB BLD-MCNC: 14.1 GM/DL (ref 12–16)
IMM GRANULOCYTES # BLD AUTO: 0 THOU/MM3 (ref 0–0.07)
IMM GRANULOCYTES NFR BLD AUTO: 0 %
LDLC SERPL CALC-MCNC: 153 MG/DL
LYMPHOCYTES ABSOLUTE: 1 THOU/MM3 (ref 1–4.8)
LYMPHOCYTES NFR BLD AUTO: 29.5 %
MCH RBC QN AUTO: 32.3 PG (ref 26–33)
MCHC RBC AUTO-ENTMCNC: 32.8 GM/DL (ref 32.2–35.5)
MCV RBC AUTO: 98.4 FL (ref 81–99)
MONOCYTES ABSOLUTE: 0.3 THOU/MM3 (ref 0.4–1.3)
MONOCYTES NFR BLD AUTO: 8.3 %
NEUTROPHILS NFR BLD AUTO: 59.2 %
NRBC BLD AUTO-RTO: 0 /100 WBC
PLATELET # BLD AUTO: 152 THOU/MM3 (ref 130–400)
PMV BLD AUTO: 12.3 FL (ref 9.4–12.4)
POTASSIUM SERPL-SCNC: 4.2 MEQ/L (ref 3.5–5.2)
PROT SERPL-MCNC: 6.2 G/DL (ref 6.1–8)
RBC # BLD AUTO: 4.37 MILL/MM3 (ref 4.2–5.4)
SEGMENTED NEUTROPHILS ABSOLUTE COUNT: 2 THOU/MM3 (ref 1.8–7.7)
SODIUM SERPL-SCNC: 145 MEQ/L (ref 135–145)
T4 FREE SERPL-MCNC: 1.17 NG/DL (ref 0.93–1.76)
TRIGL SERPL-MCNC: 61 MG/DL (ref 0–199)
TSH SERPL DL<=0.005 MIU/L-ACNC: 1.07 UIU/ML (ref 0.4–4.2)
WBC # BLD AUTO: 3.4 THOU/MM3 (ref 4.8–10.8)

## 2023-10-25 NOTE — TELEPHONE ENCOUNTER
----- Message from Griselda Mclean MD sent at 10/25/2023  3:52 PM EDT -----  Please let her know that her cholesterol is significantly worse. Any changes to her diet or exercise?

## 2023-10-25 NOTE — TELEPHONE ENCOUNTER
----- Message from Sandra Cheema MD sent at 10/25/2023  3:52 PM EDT -----  Please let her know that her cholesterol is significantly worse. Any changes to her diet or exercise?

## 2023-11-06 ENCOUNTER — OFFICE VISIT (OUTPATIENT)
Dept: FAMILY MEDICINE CLINIC | Age: 63
End: 2023-11-06

## 2023-11-06 VITALS
RESPIRATION RATE: 12 BRPM | SYSTOLIC BLOOD PRESSURE: 132 MMHG | DIASTOLIC BLOOD PRESSURE: 84 MMHG | WEIGHT: 150.6 LBS | HEART RATE: 88 BPM | HEIGHT: 63 IN | BODY MASS INDEX: 26.68 KG/M2

## 2023-11-06 DIAGNOSIS — D72.819 LEUKOPENIA, UNSPECIFIED TYPE: ICD-10-CM

## 2023-11-06 DIAGNOSIS — I49.3 PVC (PREMATURE VENTRICULAR CONTRACTION): ICD-10-CM

## 2023-11-06 DIAGNOSIS — E78.49 OTHER HYPERLIPIDEMIA: Primary | ICD-10-CM

## 2023-11-06 PROCEDURE — 99213 OFFICE O/P EST LOW 20 MIN: CPT | Performed by: FAMILY MEDICINE

## 2023-11-06 ASSESSMENT — ENCOUNTER SYMPTOMS
NAUSEA: 0
CONSTIPATION: 0
VOMITING: 0
CHEST TIGHTNESS: 0
SHORTNESS OF BREATH: 0
DIARRHEA: 0
EYES NEGATIVE: 1
ABDOMINAL PAIN: 0
COUGH: 0

## 2023-11-06 NOTE — PROGRESS NOTES
and dry. Findings: No rash. Neurological:      Mental Status: She is alert and oriented to person, place, and time. Psychiatric:         Behavior: Behavior normal.       :       Diagnosis Orders   1. Other hyperlipidemia  AST    ALT    Lipid Panel      2. Leukopenia, unspecified type  CBC with Auto Differential      3. PVC (premature ventricular contraction)  Improved per patient.           :      Requested Prescriptions      No prescriptions requested or ordered in this encounter     Current Outpatient Medications   Medication Sig Dispense Refill    dutasteride (AVODART) 0.5 MG capsule Take 1 capsule by mouth daily      PARoxetine (PAXIL) 20 MG tablet TAKE 1 TABLET BY MOUTH EVERY DAY IN THE MORNING 90 tablet 0    b complex vitamins capsule Take 1 capsule by mouth daily      Cholecalciferol (VITAMIN D3) 50 MCG (2000 UT) CAPS Take 1 capsule by mouth daily      fluticasone (FLONASE) 50 MCG/ACT nasal spray 1 spray by Each Nostril route daily (Patient taking differently: 1 spray by Each Nostril route daily as needed) 16 g 0    COLLAGEN-ANTIMICROBIAL EX       Biotin 10 MG CAPS Take by mouth       No current facility-administered medications for this visit. Orders Placed This Encounter   Procedures    AST     Laboratory Test to be done in 3 months     Standing Status:   Future     Standing Expiration Date:   11/5/2024    ALT     Laboratory Test to be done in 3 months     Standing Status:   Future     Standing Expiration Date:   11/5/2024    Lipid Panel     Laboratory Test to be done in 3 months     Standing Status:   Future     Standing Expiration Date:   11/5/2024     Order Specific Question:   Is Patient Fasting?/# of Hours     Answer:   12    CBC with Auto Differential     Standing Status:   Future     Standing Expiration Date:   11/6/2024     She wonders if her cholesterol increased because of her taking Avodart. Continue current medications.     Return in about 4 months (around 3/6/2024), or if

## 2023-11-21 NOTE — ED NOTES
Pt updated on POC. VSS. Will continue to monitor.       Humberto Mcleod RN  10/10/20 2125 [address]: Tobacco Use And Help With Quitting Among Adolescents: Patient screened for tobacco and never smoked Detail Level: Detailed

## 2023-11-28 ENCOUNTER — TELEPHONE (OUTPATIENT)
Dept: FAMILY MEDICINE CLINIC | Age: 63
End: 2023-11-28

## 2023-11-28 NOTE — TELEPHONE ENCOUNTER
Pt called asking for results of CT Cardiac Calcium Scoring test that was done yesterday.       Please call pt back at [phone]

## 2023-12-06 ENCOUNTER — TELEPHONE (OUTPATIENT)
Dept: FAMILY MEDICINE CLINIC | Age: 63
End: 2023-12-06

## 2023-12-06 NOTE — TELEPHONE ENCOUNTER
Pt called says shes been having sinus pressure and drainage with a headache for 2 days wanted to know if dr could send something in for her. I advised her to take a covid test she said she would and give us a call back with results

## 2023-12-07 ENCOUNTER — HOSPITAL ENCOUNTER (EMERGENCY)
Age: 63
Discharge: HOME | End: 2023-12-07
Payer: COMMERCIAL

## 2023-12-07 VITALS
HEART RATE: 96 BPM | DIASTOLIC BLOOD PRESSURE: 78 MMHG | RESPIRATION RATE: 16 BRPM | TEMPERATURE: 98.5 F | SYSTOLIC BLOOD PRESSURE: 114 MMHG | OXYGEN SATURATION: 97 %

## 2023-12-07 DIAGNOSIS — H66.92 LEFT OTITIS MEDIA, UNSPECIFIED OTITIS MEDIA TYPE: Primary | ICD-10-CM

## 2023-12-07 PROCEDURE — 99213 OFFICE O/P EST LOW 20 MIN: CPT | Performed by: NURSE PRACTITIONER

## 2023-12-07 RX ORDER — AMOXICILLIN AND CLAVULANATE POTASSIUM 875; 125 MG/1; MG/1
1 TABLET, FILM COATED ORAL 2 TIMES DAILY
Qty: 14 TABLET | Refills: 0 | Status: SHIPPED | OUTPATIENT
Start: 2023-12-07 | End: 2023-12-14

## 2023-12-07 RX ORDER — PSEUDOEPHEDRINE HCL 30 MG
30 TABLET ORAL EVERY 4 HOURS PRN
Qty: 30 TABLET | Refills: 0 | Status: SHIPPED | OUTPATIENT
Start: 2023-12-07 | End: 2023-12-12

## 2023-12-07 ASSESSMENT — ENCOUNTER SYMPTOMS
SWOLLEN GLANDS: 0
SINUS PAIN: 1
WHEEZING: 0
RHINORRHEA: 1
STRIDOR: 0
SORE THROAT: 1
COUGH: 1
CHEST TIGHTNESS: 0
SHORTNESS OF BREATH: 0
APNEA: 0
CHOKING: 0

## 2023-12-07 ASSESSMENT — PAIN DESCRIPTION - LOCATION: LOCATION: HEAD

## 2023-12-07 ASSESSMENT — PAIN SCALES - GENERAL: PAINLEVEL_OUTOF10: 6

## 2023-12-07 ASSESSMENT — PAIN - FUNCTIONAL ASSESSMENT: PAIN_FUNCTIONAL_ASSESSMENT: 0-10

## 2023-12-07 NOTE — ED PROVIDER NOTES
[address]  Urgent Care Encounter      CHIEF COMPLAINT     No chief complaint on file. Nurses Notes reviewed and I agree except as noted in the HPI. HISTORY OFPRESENT ILLNESS   Evelin Durand is a 59 y.o. The history is provided by the patient. No  was used. URI  Presenting symptoms: congestion, cough, facial pain, fatigue, fever, rhinorrhea and sore throat    Presenting symptoms: no ear pain    Severity:  Severe  Onset quality:  Sudden  Duration:  2 days  Timing:  Constant  Progression:  Unchanged  Chronicity:  New  Relieved by:  Nothing  Worsened by:  Certain positions  Ineffective treatments:  OTC medications  Associated symptoms: headaches and sinus pain    Associated symptoms: no arthralgias, no myalgias, no neck pain, no sneezing, no swollen glands and no wheezing    Risk factors: not elderly, no chronic cardiac disease, no chronic kidney disease, no chronic respiratory disease, no diabetes mellitus, no immunosuppression, no recent illness, no recent travel and no sick contacts        REVIEW OF SYSTEMS     Review of Systems   Constitutional:  Positive for fatigue and fever. Negative for activity change, appetite change, chills and diaphoresis. HENT:  Positive for congestion, rhinorrhea, sinus pain and sore throat. Negative for ear pain and sneezing. Respiratory:  Positive for cough. Negative for apnea, choking, chest tightness, shortness of breath, wheezing and stridor. Cardiovascular:  Negative for chest pain, palpitations and leg swelling. Musculoskeletal:  Negative for arthralgias, myalgias and neck pain. Neurological:  Positive for headaches. Negative for dizziness and light-headedness. PAST MEDICAL HISTORY   History reviewed. No pertinent past medical history. SURGICAL HISTORY     Patient  has a past surgical history that includes Tonsillectomy; Adenoidectomy;  Appendectomy; Dilation and curettage of uterus; Myringotomy [facility] [address]  [phone]    Schedule an appointment as soon as possible for a visit       DISCHARGE MEDICATIONS:  Discharge Medication List as of 12/7/2023 12:51 PM        START taking these medications    Details   amoxicillin-clavulanate (AUGMENTIN) 875-125 MG per tablet Take 1 tablet by mouth 2 times daily for 7 days, Disp-14 tablet, R-0Normal      pseudoephedrine (SUDAFED) 30 MG tablet Take 1 tablet by mouth every 4 hours as needed for Congestion (face pain), Disp-30 tablet, R-0Normal           Discharge Medication List as of 12/7/2023 12:51 PM          Sherre Nageotte, APRN - CNP Sherre Nageotte, APRN - CNP  12/07/23 1257

## 2024-01-02 RX ORDER — PAROXETINE HYDROCHLORIDE 20 MG/1
TABLET, FILM COATED ORAL
Qty: 90 TABLET | Refills: 1 | Status: SHIPPED | OUTPATIENT
Start: 2024-01-02

## 2024-01-02 NOTE — TELEPHONE ENCOUNTER
The pharmacy is  requesting a refill of the below medication which has been pended for you:     Requested Prescriptions     Pending Prescriptions Disp Refills    PARoxetine (PAXIL) 20 MG tablet [Pharmacy Med Name: PAROXETINE HCL 20 MG TABLET] 90 tablet 0     Sig: TAKE 1 TABLET BY MOUTH EVERY DAY IN THE MORNING       Last Appointment Date: 11/6/2023  Next Appointment Date: Visit date not found    No Known Allergies    Per verbal order Dr De La O sent over Rx to pharmacy.

## 2024-02-26 ENCOUNTER — OFFICE VISIT (OUTPATIENT)
Dept: FAMILY MEDICINE CLINIC | Age: 64
End: 2024-02-26

## 2024-02-26 VITALS
SYSTOLIC BLOOD PRESSURE: 132 MMHG | HEART RATE: 72 BPM | RESPIRATION RATE: 16 BRPM | BODY MASS INDEX: 26.68 KG/M2 | DIASTOLIC BLOOD PRESSURE: 80 MMHG | HEIGHT: 63 IN

## 2024-02-26 DIAGNOSIS — H91.92 DECREASED HEARING OF LEFT EAR: Primary | ICD-10-CM

## 2024-02-26 DIAGNOSIS — H69.92 DYSFUNCTION OF LEFT EUSTACHIAN TUBE: ICD-10-CM

## 2024-02-26 PROCEDURE — 99213 OFFICE O/P EST LOW 20 MIN: CPT | Performed by: FAMILY MEDICINE

## 2024-02-26 RX ORDER — OMEGA-3S/DHA/EPA/FISH OIL/D3 300MG-1000
1200 CAPSULE ORAL EVERY OTHER DAY
COMMUNITY

## 2024-02-26 ASSESSMENT — ENCOUNTER SYMPTOMS
CONSTIPATION: 0
COUGH: 0
NAUSEA: 0
EYES NEGATIVE: 1
SHORTNESS OF BREATH: 0
DIARRHEA: 0
ABDOMINAL PAIN: 0
VOMITING: 0
CHEST TIGHTNESS: 0

## 2024-02-26 ASSESSMENT — PATIENT HEALTH QUESTIONNAIRE - PHQ9
2. FEELING DOWN, DEPRESSED OR HOPELESS: 0
1. LITTLE INTEREST OR PLEASURE IN DOING THINGS: 0
SUM OF ALL RESPONSES TO PHQ QUESTIONS 1-9: 0
7. TROUBLE CONCENTRATING ON THINGS, SUCH AS READING THE NEWSPAPER OR WATCHING TELEVISION: 0
SUM OF ALL RESPONSES TO PHQ QUESTIONS 1-9: 0
9. THOUGHTS THAT YOU WOULD BE BETTER OFF DEAD, OR OF HURTING YOURSELF: 0
10. IF YOU CHECKED OFF ANY PROBLEMS, HOW DIFFICULT HAVE THESE PROBLEMS MADE IT FOR YOU TO DO YOUR WORK, TAKE CARE OF THINGS AT HOME, OR GET ALONG WITH OTHER PEOPLE: 0
SUM OF ALL RESPONSES TO PHQ QUESTIONS 1-9: 0
3. TROUBLE FALLING OR STAYING ASLEEP: 0
5. POOR APPETITE OR OVEREATING: 0
SUM OF ALL RESPONSES TO PHQ9 QUESTIONS 1 & 2: 0
SUM OF ALL RESPONSES TO PHQ QUESTIONS 1-9: 0
8. MOVING OR SPEAKING SO SLOWLY THAT OTHER PEOPLE COULD HAVE NOTICED. OR THE OPPOSITE, BEING SO FIGETY OR RESTLESS THAT YOU HAVE BEEN MOVING AROUND A LOT MORE THAN USUAL: 0
6. FEELING BAD ABOUT YOURSELF - OR THAT YOU ARE A FAILURE OR HAVE LET YOURSELF OR YOUR FAMILY DOWN: 0
4. FEELING TIRED OR HAVING LITTLE ENERGY: 0

## 2024-02-26 NOTE — PROGRESS NOTES
Genitourinary: Negative.    Musculoskeletal:  Positive for arthralgias.   Skin: Negative.  Negative for rash.   Neurological:  Negative for dizziness, syncope, weakness, light-headedness, numbness and headaches.   Psychiatric/Behavioral: Negative.         :   /80   Pulse 72   Resp 16   Ht 1.6 m (5' 3\")   LMP 08/13/2015   BMI 26.68 kg/m²   Wt Readings from Last 3 Encounters:   11/06/23 68.3 kg (150 lb 9.6 oz)   10/23/23 69.5 kg (153 lb 2 oz)   01/15/22 63.5 kg (140 lb)     Physical Exam  Vitals and nursing note reviewed.   Constitutional:       General: She is not in acute distress.     Appearance: She is well-developed. She is not diaphoretic.   HENT:      Head: Normocephalic and atraumatic.      Right Ear: Ear canal and external ear normal.      Left Ear: Ear canal and external ear normal.      Ears:      Comments: She has some scars on her TMs but no evidence of infection     Nose: Nose normal.      Mouth/Throat:      Lips: Pink.      Mouth: Mucous membranes are moist.   Eyes:      General: No scleral icterus.        Right eye: No discharge.         Left eye: No discharge.      Conjunctiva/sclera: Conjunctivae normal.      Pupils: Pupils are equal, round, and reactive to light.   Neck:      Thyroid: No thyromegaly.      Vascular: No JVD.   Cardiovascular:      Rate and Rhythm: Normal rate and regular rhythm.      Heart sounds: Normal heart sounds. No murmur heard.  Pulmonary:      Effort: No respiratory distress.      Breath sounds: Normal breath sounds. No wheezing, rhonchi or rales.   Abdominal:      General: Bowel sounds are normal. There is no distension.      Palpations: Abdomen is soft. There is no mass.      Tenderness: There is no abdominal tenderness. There is no guarding or rebound.   Musculoskeletal:         General: Normal range of motion.      Cervical back: Normal range of motion and neck supple.   Lymphadenopathy:      Cervical: No cervical adenopathy.   Skin:     General: Skin is warm

## 2024-04-05 ENCOUNTER — HOSPITAL ENCOUNTER (OUTPATIENT)
Dept: AUDIOLOGY | Age: 64
Discharge: HOME | End: 2024-04-05
Payer: COMMERCIAL

## 2024-04-05 PROCEDURE — 92567 TYMPANOMETRY: CPT | Performed by: AUDIOLOGIST

## 2024-04-05 PROCEDURE — 92557 COMPREHENSIVE HEARING TEST: CPT | Performed by: AUDIOLOGIST

## 2024-04-05 NOTE — PROGRESS NOTES
AUDIOLOGICAL EVALUATION      REASON FOR TESTING:  Audiometric evaluation per the request of Linda De La O MD, due to the diagnosis of decreased hearing in the left ear. The patient reports gradually increasing hearing difficulties, greater in the left ear, for several years. She also notes occasional \"ringing\" tinnitus in the left ear. The patient notes frequent ear infections in the left ear as well as episodic vertigo, occurring approximately once per year. There is a family history of hearing loss and vertigo noted in the patient's mother. The patient denies any current otalgia, otorrhea or aural fullness.    OTOSCOPY: Clear external ear canals, visible tympanic membranes with tympanosclerosis noted, bilaterally.    AUDIOGRAM        Reliability: Good.     COMMENTS: Pure tone audiogram indicates normal hearing thresholds in the right ear at 250-6000Hz with a mild high frequency hearing loss at 8000Hz only. Left ear thresholds indicate a mild to moderate mixed hearing loss. Speech reception thresholds agree with pure tone averages, bilaterally. Word recognition scores are excellent at 100%, bilaterally, with speech presented at 50dB in the right ear and 70dB in the left ear. Tympanometry indicates normal ear canal volume, peak pressure and compliance, bilaterally.       RECOMMENDATION(S):   Follow up with referring provider regarding results.  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 Further testing may be considered to rule out possible retrocochlear and/or other middle ear/ossicular chain abnormality, per discretion of physician.  Repeat audiologic testing as medically indicated, with any noticeable changes in hearing sensitivity or tinnitus, or in 6 months to rule out progression or fluctuation.  Amplification options could be considered pending medical clearance and patient motivation.

## 2024-04-06 ENCOUNTER — NURSE ONLY (OUTPATIENT)
Dept: LAB | Age: 64
End: 2024-04-06

## 2024-04-06 DIAGNOSIS — E78.49 OTHER HYPERLIPIDEMIA: ICD-10-CM

## 2024-04-06 DIAGNOSIS — D72.819 LEUKOPENIA, UNSPECIFIED TYPE: ICD-10-CM

## 2024-04-06 LAB
ALT SERPL W/O P-5'-P-CCNC: 11 U/L (ref 11–66)
AST SERPL-CCNC: 20 U/L (ref 5–40)
BASOPHILS ABSOLUTE: 0 THOU/MM3 (ref 0–0.1)
BASOPHILS NFR BLD AUTO: 0.9 %
CHOLEST SERPL-MCNC: 229 MG/DL (ref 100–199)
DEPRECATED RDW RBC AUTO: 43.1 FL (ref 35–45)
EOSINOPHIL NFR BLD AUTO: 2.1 %
EOSINOPHILS ABSOLUTE: 0.1 THOU/MM3 (ref 0–0.4)
ERYTHROCYTE [DISTWIDTH] IN BLOOD BY AUTOMATED COUNT: 12.2 % (ref 11.5–14.5)
HCT VFR BLD AUTO: 43 % (ref 37–47)
HDLC SERPL-MCNC: 68 MG/DL
HGB BLD-MCNC: 14.7 GM/DL (ref 12–16)
IMM GRANULOCYTES # BLD AUTO: 0 THOU/MM3 (ref 0–0.07)
IMM GRANULOCYTES NFR BLD AUTO: 0 %
LDLC SERPL CALC-MCNC: 147 MG/DL
LYMPHOCYTES ABSOLUTE: 1.2 THOU/MM3 (ref 1–4.8)
LYMPHOCYTES NFR BLD AUTO: 36.2 %
MCH RBC QN AUTO: 33 PG (ref 26–33)
MCHC RBC AUTO-ENTMCNC: 34.2 GM/DL (ref 32.2–35.5)
MCV RBC AUTO: 96.4 FL (ref 81–99)
MONOCYTES ABSOLUTE: 0.3 THOU/MM3 (ref 0.4–1.3)
MONOCYTES NFR BLD AUTO: 9.2 %
NEUTROPHILS NFR BLD AUTO: 51.6 %
NRBC BLD AUTO-RTO: 0 /100 WBC
PLATELET # BLD AUTO: 143 THOU/MM3 (ref 130–400)
PMV BLD AUTO: 12.3 FL (ref 9.4–12.4)
RBC # BLD AUTO: 4.46 MILL/MM3 (ref 4.2–5.4)
SEGMENTED NEUTROPHILS ABSOLUTE COUNT: 1.8 THOU/MM3 (ref 1.8–7.7)
TRIGL SERPL-MCNC: 70 MG/DL (ref 0–199)
WBC # BLD AUTO: 3.4 THOU/MM3 (ref 4.8–10.8)

## 2024-04-10 ENCOUNTER — TELEPHONE (OUTPATIENT)
Dept: FAMILY MEDICINE CLINIC | Age: 64
End: 2024-04-10

## 2024-04-10 DIAGNOSIS — H91.8X3 ASYMMETRICAL HEARING LOSS: Primary | ICD-10-CM

## 2024-04-10 NOTE — TELEPHONE ENCOUNTER
Patient called requesting referral to ENT Fina Mao NP [facility].  Stated she had the Audiogram done.    Please call patient back.

## 2024-04-15 ENCOUNTER — TELEPHONE (OUTPATIENT)
Dept: FAMILY MEDICINE CLINIC | Age: 64
End: 2024-04-15

## 2024-04-15 NOTE — TELEPHONE ENCOUNTER
----- Message from Linda De La O MD sent at 4/15/2024 12:45 PM EDT -----  Please let her know that her cholesterol is minimally improved from the previous blood work but it is still elevated and I would like to start medications  Please make appointment if she is in agreement to start medications.

## 2024-05-22 ENCOUNTER — TELEPHONE (OUTPATIENT)
Dept: ENT CLINIC | Age: 64
End: 2024-05-22

## 2024-05-22 NOTE — TELEPHONE ENCOUNTER
Patient was scheduled for 7/17/2024 with Jannette but she will be rounding that day now. I call the patient and rescheduled her to 7/31/2024. Patient was very upset about being rescheduled because she is having a lot of trouble hearing. Patient requested a hearing aid but was told she could not get one until she was seen in our office because it could be an inner ear issue instead of just a hearing issue. Is there any place the patient could be seen sooner or any recommendations for her in the meantime?

## 2024-06-11 ENCOUNTER — OFFICE VISIT (OUTPATIENT)
Dept: ENT CLINIC | Age: 64
End: 2024-06-11
Payer: COMMERCIAL

## 2024-06-11 VITALS
OXYGEN SATURATION: 97 % | TEMPERATURE: 97.5 F | BODY MASS INDEX: 25.41 KG/M2 | SYSTOLIC BLOOD PRESSURE: 116 MMHG | HEART RATE: 72 BPM | HEIGHT: 63 IN | WEIGHT: 143.4 LBS | DIASTOLIC BLOOD PRESSURE: 70 MMHG

## 2024-06-11 DIAGNOSIS — R42 VERTIGO: ICD-10-CM

## 2024-06-11 DIAGNOSIS — H69.92 ETD (EUSTACHIAN TUBE DYSFUNCTION), LEFT: ICD-10-CM

## 2024-06-11 DIAGNOSIS — H90.72 MIXED CONDUCTIVE AND SENSORINEURAL HEARING LOSS OF LEFT EAR WITH UNRESTRICTED HEARING OF RIGHT EAR: ICD-10-CM

## 2024-06-11 DIAGNOSIS — H91.8X3 ASYMMETRICAL HEARING LOSS: Primary | ICD-10-CM

## 2024-06-11 DIAGNOSIS — J30.9 ALLERGIC RHINITIS, UNSPECIFIED SEASONALITY, UNSPECIFIED TRIGGER: ICD-10-CM

## 2024-06-11 PROCEDURE — 1036F TOBACCO NON-USER: CPT | Performed by: PHYSICIAN ASSISTANT

## 2024-06-11 PROCEDURE — G8427 DOCREV CUR MEDS BY ELIG CLIN: HCPCS | Performed by: PHYSICIAN ASSISTANT

## 2024-06-11 PROCEDURE — G8419 CALC BMI OUT NRM PARAM NOF/U: HCPCS | Performed by: PHYSICIAN ASSISTANT

## 2024-06-11 PROCEDURE — 3017F COLORECTAL CA SCREEN DOC REV: CPT | Performed by: PHYSICIAN ASSISTANT

## 2024-06-11 PROCEDURE — 99204 OFFICE O/P NEW MOD 45 MIN: CPT | Performed by: PHYSICIAN ASSISTANT

## 2024-06-11 RX ORDER — LORATADINE 10 MG/1
10 TABLET ORAL DAILY
Qty: 30 TABLET | Refills: 3 | Status: SHIPPED | OUTPATIENT
Start: 2024-06-11

## 2024-06-11 RX ORDER — FLUTICASONE PROPIONATE 50 MCG
2 SPRAY, SUSPENSION (ML) NASAL DAILY
Qty: 16 G | Refills: 2 | Status: SHIPPED | OUTPATIENT
Start: 2024-06-11

## 2024-06-11 NOTE — PROGRESS NOTES
[facility] [place] [facility]  [facility] EAR, NOSE AND THROAT  [address]  [address]  [facility] [address]  Dept: [phone]  Dept Fax: [phone]  Loc: [phone]    Ana Laura Daniels is a 64 y.o. female who was referred by Linda De La O MD for:  Chief Complaint   Patient presents with    Hearing Loss     New patient here for evaluation of her asymmetrical hearing loss. Referred by Linda De La O MD   .    HPI:     Hearing Loss: Patient presents today with complaints of hearing loss. Onset of symptoms was gradual, beginning since she was a kid. Symptoms have been gradually worsening since that time. Symptoms include: hearing loss involving both ears, difficult with voices in crowded rooms, and associated tinnitus. Left ear feels plugged currently, but not painful. She states the plugged sensation is subtle/mild currently, but it can vary to very severe. There is not a history of noise exposure. There is not a family history of early hearing loss. She has had 4-5 sets of tubes in her life, 3-4 as an adult and once as a child. She has noticed if she lays on her right side, she can't hear out of the left but hasn't had the issue vice versa. Tinnitus started ~6 months ago on the left only. She reports it is constant, nonpulsatile, but occasionally she hears her pulse in the left ear. She denies a known history of allergies, but questions if she has them. She has used Flonase in the past and helped her nose/congestion, but also seemed to help control ear symptoms. She has been out of Flonase for ~2 months.     She sometimes gets vertigo, her mom had it in the past. Feels like the room is spinning around her during those time. She had one episode in the last year, typically goes to bed and wakes up feeling better.      Subjective:      REVIEW OF SYSTEMS:    Pertinent positives as noted in the HPI. All other systems reviewed and negative.    ALLERGIES:  Patient has no known

## 2024-07-03 DIAGNOSIS — H90.72 MIXED CONDUCTIVE AND SENSORINEURAL HEARING LOSS OF LEFT EAR WITH UNRESTRICTED HEARING OF RIGHT EAR: ICD-10-CM

## 2024-07-03 DIAGNOSIS — J30.9 ALLERGIC RHINITIS, UNSPECIFIED SEASONALITY, UNSPECIFIED TRIGGER: ICD-10-CM

## 2024-07-03 DIAGNOSIS — H69.92 ETD (EUSTACHIAN TUBE DYSFUNCTION), LEFT: ICD-10-CM

## 2024-07-05 RX ORDER — FLUTICASONE PROPIONATE 50 MCG
2 SPRAY, SUSPENSION (ML) NASAL DAILY
Qty: 48 G | Refills: 1 | Status: SHIPPED | OUTPATIENT
Start: 2024-07-05

## 2024-07-11 ENCOUNTER — TELEPHONE (OUTPATIENT)
Dept: ENT CLINIC | Age: 64
End: 2024-07-11

## 2024-07-11 ENCOUNTER — HOSPITAL ENCOUNTER (OUTPATIENT)
Dept: CT IMAGING | Age: 64
Discharge: HOME | End: 2024-07-11
Payer: COMMERCIAL

## 2024-07-11 DIAGNOSIS — H91.8X3 ASYMMETRICAL HEARING LOSS: ICD-10-CM

## 2024-07-11 DIAGNOSIS — H90.72 MIXED CONDUCTIVE AND SENSORINEURAL HEARING LOSS OF LEFT EAR WITH UNRESTRICTED HEARING OF RIGHT EAR: ICD-10-CM

## 2024-07-11 DIAGNOSIS — R42 VERTIGO: ICD-10-CM

## 2024-07-11 PROCEDURE — 70480 CT ORBIT/EAR/FOSSA W/O DYE: CPT

## 2024-07-11 NOTE — TELEPHONE ENCOUNTER
Patient called the office back and I informed her of everything Scott said. Patient would like think about the MRI and let us know if she would like it or not. Patient voiced understanding of everything and thanked me.

## 2024-07-11 NOTE — TELEPHONE ENCOUNTER
Please contact the patient and let her know that the CT did not reveal an obvious cause for her hearing loss and previous balance issues, including the scarring between the bones in the ear that can cause some of the symptoms that she is experiencing. Unfortunately the scan did not get quite a good enough view of the nerve deep in the ears to rule out the growth that can cause asymmetrical hearing loss/balance issues as we discussed in the office.  The radiologist advised getting an MRI deep in the ears to better look at the area. If she is willing, I would advise getting the MRI scheduled prior to her follow up with Dr Lewis. If the MRI is negative, she would be cleared to pursue hearing aids in my opinion.

## 2024-07-11 NOTE — TELEPHONE ENCOUNTER
Noted, that's fine. She can discuss this possibility further with Dr Lewis at follow up in addition to considering tube placement.    Spoke with patient's wife  Palliative care was consulted  Wife will discuss pain management with the palliative care team

## 2024-08-19 RX ORDER — PAROXETINE HYDROCHLORIDE 20 MG/1
TABLET, FILM COATED ORAL
Qty: 90 TABLET | Refills: 0 | Status: SHIPPED | OUTPATIENT
Start: 2024-08-19

## 2024-08-19 NOTE — TELEPHONE ENCOUNTER
The pharmacy is requesting a refill of the below medication which has been pended for you:     Requested Prescriptions     Pending Prescriptions Disp Refills    PARoxetine (PAXIL) 20 MG tablet [Pharmacy Med Name: PAROXETINE HCL 20 MG TABLET] 90 tablet 1     Sig: TAKE 1 TABLET BY MOUTH EVERY DAY IN THE MORNING       Last Appointment Date: 2/26/2024  Next Appointment Date: Visit date not found    No Known Allergies

## 2024-10-08 ENCOUNTER — OFFICE VISIT (OUTPATIENT)
Dept: ENT CLINIC | Age: 64
End: 2024-10-08
Payer: COMMERCIAL

## 2024-10-08 VITALS
BODY MASS INDEX: 25.78 KG/M2 | HEART RATE: 75 BPM | SYSTOLIC BLOOD PRESSURE: 120 MMHG | DIASTOLIC BLOOD PRESSURE: 76 MMHG | HEIGHT: 63 IN | RESPIRATION RATE: 18 BRPM | WEIGHT: 145.5 LBS | TEMPERATURE: 97.1 F | OXYGEN SATURATION: 97 %

## 2024-10-08 DIAGNOSIS — H93.8X2 FULLNESS IN LEFT EAR: ICD-10-CM

## 2024-10-08 DIAGNOSIS — H91.8X3 ASYMMETRICAL HEARING LOSS: ICD-10-CM

## 2024-10-08 DIAGNOSIS — H90.72 MIXED CONDUCTIVE AND SENSORINEURAL HEARING LOSS OF LEFT EAR WITH UNRESTRICTED HEARING OF RIGHT EAR: Primary | ICD-10-CM

## 2024-10-08 PROCEDURE — 92504 EAR MICROSCOPY EXAMINATION: CPT | Performed by: OTOLARYNGOLOGY

## 2024-10-08 PROCEDURE — G8419 CALC BMI OUT NRM PARAM NOF/U: HCPCS | Performed by: OTOLARYNGOLOGY

## 2024-10-08 PROCEDURE — G8427 DOCREV CUR MEDS BY ELIG CLIN: HCPCS | Performed by: OTOLARYNGOLOGY

## 2024-10-08 PROCEDURE — G8484 FLU IMMUNIZE NO ADMIN: HCPCS | Performed by: OTOLARYNGOLOGY

## 2024-10-08 PROCEDURE — 1036F TOBACCO NON-USER: CPT | Performed by: OTOLARYNGOLOGY

## 2024-10-08 PROCEDURE — 3017F COLORECTAL CA SCREEN DOC REV: CPT | Performed by: OTOLARYNGOLOGY

## 2024-10-08 PROCEDURE — 99214 OFFICE O/P EST MOD 30 MIN: CPT | Performed by: OTOLARYNGOLOGY

## 2024-10-08 RX ORDER — FINASTERIDE 5 MG/1
TABLET, FILM COATED ORAL
COMMUNITY
Start: 2024-10-04

## 2024-10-08 RX ORDER — CLOBETASOL PROPIONATE 0.05 G/100ML
SHAMPOO TOPICAL
COMMUNITY
Start: 2024-09-03

## 2024-10-08 NOTE — PROGRESS NOTES
[facility] [place] [facility]  [facility] EAR, NOSE AND THROAT  [address]  [place] [id number]  Dept: [phone]  Dept Fax: [phone]  Loc: [phone]    Ana Laura Daniels is a 64 y.o. female who was referred by No ref. provider found for:  Chief Complaint   Patient presents with    Other     Patient here for office visit to discuss possible tube placement. Patient stated that she is doing good but her left ear feels plugged.    .    HPI:     Ana Laura Daniels is a 64 y.o. female who presents today for evaluation of chronic fullness in her left ear.  Audio from April, 2024 is reviewed.  This suggests a mild asymmetry in the hearing and a conductive component,, but the tympanograms were classic type A.    History:     No Known Allergies  Current Outpatient Medications   Medication Sig Dispense Refill    finasteride (PROSCAR) 5 MG tablet       Clobetasol Propionate 0.05 % SHAM 1(ONE) APPLICATION(S) TOPICAL EVERY DAY      PARoxetine (PAXIL) 20 MG tablet TAKE 1 TABLET BY MOUTH EVERY DAY IN THE MORNING 90 tablet 0    fluticasone (FLONASE) 50 MCG/ACT nasal spray SPRAY 2 SPRAYS INTO EACH NOSTRIL EVERY DAY 48 g 1    b complex vitamins capsule Take 1 capsule by mouth daily      Cholecalciferol (VITAMIN D3) 50 MCG (2000 UT) CAPS Take 1 capsule by mouth daily      COLLAGEN-ANTIMICROBIAL EX       Biotin 10 MG CAPS Take by mouth       No current facility-administered medications for this visit.     History reviewed. No pertinent past medical history.   Past Surgical History:   Procedure Laterality Date    ADENOIDECTOMY      APPENDECTOMY      CATARACT REMOVAL Right 11/12/15    Dr Morales    DILATION AND CURETTAGE OF UTERUS      MYRINGOTOMY AND TYMPANOSTOMY TUBE PLACEMENT      SKIN CANCER EXCISION Right 11/11/15    shoulder, Dr Sanz    TONSILLECTOMY       Family History   Problem Relation Age of Onset    Heart Disease Mother      Social History     Tobacco Use    Smoking status: Never

## 2024-10-10 ENCOUNTER — HOSPITAL ENCOUNTER (OUTPATIENT)
Dept: AUDIOLOGY | Age: 64
Discharge: HOME | End: 2024-10-10
Payer: COMMERCIAL

## 2024-10-10 PROCEDURE — 92557 COMPREHENSIVE HEARING TEST: CPT | Performed by: AUDIOLOGIST

## 2024-10-10 PROCEDURE — 92567 TYMPANOMETRY: CPT | Performed by: AUDIOLOGIST

## 2024-10-10 NOTE — PROGRESS NOTES
AUDIOLOGICAL EVALUATION      REASON FOR TESTING:  Audiometric evaluation per the request of Kraig Lewis MD, due to the diagnosis of mixed hearing loss in the left ear. The patient denies any significant changes in hearing sensitivity or general health since her previous audiogram in this clinic on 4/05/24. She denies any otalgia or otorrhea. She does note a constant pulsatile tinnitus in the left ear. No recent dizziness episodes noted. The patient was seen by Dr. Lewis on 10/08/24. Recent head CT noted in patient chart 7/11/24. Previous brain MRI noted 6/03/13.     OTOSCOPY: Clear external ear canals, bilaterally.     AUDIOGRAM              Reliability: Good    COMMENTS: Pure tone audiogram indicates normal to borderline normal hearing sensitivity in the right ear and mild to moderately-severe mixed hearing loss in the left ear. Speech reception thresholds agree with pure tone averages, bilaterally. Word recognition scores are excellent, bilaterally, with speech presented at 55dB in the right ear and 65dB in the left ear. Tympanometry indicates normal ear canal volume and peak pressure with slightly hypercompliant tympanic membrane mobility in the left ear (1.61mmho) compared to the right (1.13mmho). Patulous eustachian tube testing, performed per the request of referring physician, was negative, bilaterally.      RECOMMENDATION(S):   ENT follow up as planned. Results forwarded to Dr. Lewis for review.   Repeat audiologic testing along with medical management. Patient scheduled to return in 6 months.  Amplification options could be considered. Patient advised to contact insurance carrier for in-network hearing aid services.          PREVIOUS AUDIOGRAM:

## 2024-10-29 ENCOUNTER — HOSPITAL ENCOUNTER (OUTPATIENT)
Dept: WOMENS IMAGING | Age: 64
Discharge: HOME | End: 2024-10-29
Payer: COMMERCIAL

## 2024-10-29 DIAGNOSIS — Z12.31 VISIT FOR SCREENING MAMMOGRAM: ICD-10-CM

## 2024-10-29 PROCEDURE — 77063 BREAST TOMOSYNTHESIS BI: CPT

## 2024-11-04 ENCOUNTER — OFFICE VISIT (OUTPATIENT)
Dept: FAMILY MEDICINE CLINIC | Age: 64
End: 2024-11-04

## 2024-11-04 VITALS
RESPIRATION RATE: 12 BRPM | SYSTOLIC BLOOD PRESSURE: 118 MMHG | DIASTOLIC BLOOD PRESSURE: 66 MMHG | HEART RATE: 64 BPM | HEIGHT: 62 IN | WEIGHT: 143.6 LBS | BODY MASS INDEX: 26.43 KG/M2

## 2024-11-04 DIAGNOSIS — E78.49 OTHER HYPERLIPIDEMIA: Primary | ICD-10-CM

## 2024-11-04 DIAGNOSIS — F41.9 ANXIETY: ICD-10-CM

## 2024-11-04 DIAGNOSIS — E55.9 VITAMIN D DEFICIENCY: ICD-10-CM

## 2024-11-04 PROCEDURE — 3017F COLORECTAL CA SCREEN DOC REV: CPT | Performed by: FAMILY MEDICINE

## 2024-11-04 PROCEDURE — G8419 CALC BMI OUT NRM PARAM NOF/U: HCPCS | Performed by: FAMILY MEDICINE

## 2024-11-04 PROCEDURE — 99213 OFFICE O/P EST LOW 20 MIN: CPT | Performed by: FAMILY MEDICINE

## 2024-11-04 PROCEDURE — G8427 DOCREV CUR MEDS BY ELIG CLIN: HCPCS | Performed by: FAMILY MEDICINE

## 2024-11-04 PROCEDURE — 1036F TOBACCO NON-USER: CPT | Performed by: FAMILY MEDICINE

## 2024-11-04 RX ORDER — PAROXETINE 20 MG/1
TABLET, FILM COATED ORAL
Qty: 90 TABLET | Refills: 1 | Status: SHIPPED | OUTPATIENT
Start: 2024-11-04

## 2024-11-04 SDOH — ECONOMIC STABILITY: FOOD INSECURITY: WITHIN THE PAST 12 MONTHS, YOU WORRIED THAT YOUR FOOD WOULD RUN OUT BEFORE YOU GOT MONEY TO BUY MORE.: NEVER TRUE

## 2024-11-04 SDOH — ECONOMIC STABILITY: FOOD INSECURITY: WITHIN THE PAST 12 MONTHS, THE FOOD YOU BOUGHT JUST DIDN'T LAST AND YOU DIDN'T HAVE MONEY TO GET MORE.: NEVER TRUE

## 2024-11-04 SDOH — ECONOMIC STABILITY: INCOME INSECURITY: HOW HARD IS IT FOR YOU TO PAY FOR THE VERY BASICS LIKE FOOD, HOUSING, MEDICAL CARE, AND HEATING?: NOT HARD AT ALL

## 2024-11-04 ASSESSMENT — ENCOUNTER SYMPTOMS
COUGH: 0
CONSTIPATION: 0
EYES NEGATIVE: 1
SHORTNESS OF BREATH: 0
VOMITING: 0
ABDOMINAL PAIN: 0
CHEST TIGHTNESS: 0
NAUSEA: 0
DIARRHEA: 0

## 2024-11-04 NOTE — PROGRESS NOTES
Date: 11/4/2024    Ana Laura Daniels is a 64 y.o. female who presents today for:  Chief Complaint   Patient presents with    Hyperlipidemia she is doing well. No concerns  She has questions about her cholesterol.       HPI:     Hyperlipidemia  This is a chronic problem. The current episode started more than 1 year ago. Pertinent negatives include no chest pain, focal sensory loss, focal weakness, leg pain, myalgias or shortness of breath. Current antihyperlipidemic treatment includes diet change and exercise. There are no compliance problems.  Risk factors for coronary artery disease include post-menopausal and dyslipidemia.       has a current medication list which includes the following prescription(s): fish oil, paroxetine, finasteride, clobetasol propionate, fluticasone, b complex vitamins, vitamin d3, collagen-antimicrobial, and biotin.    No Known Allergies    Social History     Tobacco Use    Smoking status: Never     Passive exposure: Never    Smokeless tobacco: Never   Substance Use Topics    Alcohol use: No    Drug use: No       History reviewed. No pertinent past medical history.    Past Surgical History:   Procedure Laterality Date    ADENOIDECTOMY      APPENDECTOMY      CATARACT REMOVAL Right 11/12/15    Dr Morales    DILATION AND CURETTAGE OF UTERUS      MYRINGOTOMY AND TYMPANOSTOMY TUBE PLACEMENT      SKIN CANCER EXCISION Right 11/11/15    shoulder, Dr Sanz    TONSILLECTOMY         Family History   Problem Relation Age of Onset    Heart Disease Mother      Subjective:     Review of Systems   Constitutional:  Negative for activity change, appetite change, diaphoresis, fatigue and fever.   HENT: Negative.     Eyes: Negative.    Respiratory:  Negative for cough, chest tightness and shortness of breath.    Cardiovascular:  Negative for chest pain, palpitations and leg swelling.   Gastrointestinal:  Negative for abdominal pain, constipation, diarrhea, nausea and vomiting.   Genitourinary: Negative.

## 2024-12-30 ENCOUNTER — LAB (OUTPATIENT)
Dept: LAB | Age: 64
End: 2024-12-30

## 2024-12-30 DIAGNOSIS — E78.49 OTHER HYPERLIPIDEMIA: ICD-10-CM

## 2024-12-30 DIAGNOSIS — E55.9 VITAMIN D DEFICIENCY: ICD-10-CM

## 2024-12-30 LAB
25(OH)D3 SERPL-MCNC: 38 NG/ML (ref 30–100)
ALT SERPL W/O P-5'-P-CCNC: 18 U/L (ref 11–66)
AST SERPL-CCNC: 22 U/L (ref 5–40)
CHOLEST SERPL-MCNC: 261 MG/DL (ref 100–199)
HDLC SERPL-MCNC: 76 MG/DL
LDLC SERPL CALC-MCNC: 165 MG/DL
TRIGL SERPL-MCNC: 100 MG/DL (ref 0–199)

## 2025-01-06 ENCOUNTER — TELEPHONE (OUTPATIENT)
Dept: FAMILY MEDICINE CLINIC | Age: 65
End: 2025-01-06

## 2025-01-06 NOTE — TELEPHONE ENCOUNTER
----- Message from Dr. Nam Duarte MD sent at 1/6/2025  5:20 AM EST -----  Vitamin D level gradually going lower so should increase to 2 pills a day and cholesterol is higher at 261 should so should really consider Crestor 5 mg a day    Please call

## 2025-01-06 NOTE — TELEPHONE ENCOUNTER
Pt informed and said that she is trying some other things and will speak with Dr De La O at her appointment.

## 2025-02-03 ENCOUNTER — HOSPITAL ENCOUNTER (EMERGENCY)
Age: 65
Discharge: HOME | End: 2025-02-03
Payer: COMMERCIAL

## 2025-02-03 VITALS
OXYGEN SATURATION: 95 % | DIASTOLIC BLOOD PRESSURE: 78 MMHG | RESPIRATION RATE: 18 BRPM | HEART RATE: 86 BPM | TEMPERATURE: 98.1 F | SYSTOLIC BLOOD PRESSURE: 109 MMHG

## 2025-02-03 DIAGNOSIS — R51.9 ACUTE NONINTRACTABLE HEADACHE, UNSPECIFIED HEADACHE TYPE: ICD-10-CM

## 2025-02-03 DIAGNOSIS — R09.89 SUSPECTED NOVEL INFLUENZA A VIRUS INFECTION: Primary | ICD-10-CM

## 2025-02-03 PROCEDURE — 99213 OFFICE O/P EST LOW 20 MIN: CPT | Performed by: EMERGENCY MEDICINE

## 2025-02-03 PROCEDURE — 6360000002 HC RX W HCPCS: Performed by: EMERGENCY MEDICINE

## 2025-02-03 PROCEDURE — 99213 OFFICE O/P EST LOW 20 MIN: CPT

## 2025-02-03 PROCEDURE — 96372 THER/PROPH/DIAG INJ SC/IM: CPT

## 2025-02-03 RX ORDER — IBUPROFEN 200 MG
200 TABLET ORAL EVERY 6 HOURS PRN
COMMUNITY

## 2025-02-03 RX ORDER — DIPHENHYDRAMINE HYDROCHLORIDE 50 MG/ML
25 INJECTION INTRAMUSCULAR; INTRAVENOUS ONCE
Status: COMPLETED | OUTPATIENT
Start: 2025-02-03 | End: 2025-02-03

## 2025-02-03 RX ORDER — KETOROLAC TROMETHAMINE 30 MG/ML
60 INJECTION, SOLUTION INTRAMUSCULAR; INTRAVENOUS ONCE
Status: COMPLETED | OUTPATIENT
Start: 2025-02-03 | End: 2025-02-03

## 2025-02-03 RX ORDER — ONDANSETRON 4 MG/1
4 TABLET, ORALLY DISINTEGRATING ORAL 3 TIMES DAILY PRN
Qty: 12 TABLET | Refills: 0 | Status: SHIPPED | OUTPATIENT
Start: 2025-02-03

## 2025-02-03 RX ORDER — METOCLOPRAMIDE HYDROCHLORIDE 5 MG/ML
10 INJECTION INTRAMUSCULAR; INTRAVENOUS ONCE
Status: COMPLETED | OUTPATIENT
Start: 2025-02-03 | End: 2025-02-03

## 2025-02-03 RX ADMIN — DIPHENHYDRAMINE HYDROCHLORIDE 25 MG: 50 INJECTION INTRAMUSCULAR; INTRAVENOUS at 13:52

## 2025-02-03 RX ADMIN — KETOROLAC TROMETHAMINE 60 MG: 60 INJECTION, SOLUTION INTRAMUSCULAR at 13:51

## 2025-02-03 RX ADMIN — METOCLOPRAMIDE HYDROCHLORIDE 10 MG: 5 INJECTION, SOLUTION INTRAMUSCULAR; INTRAVENOUS at 13:52

## 2025-02-03 ASSESSMENT — PAIN SCALES - GENERAL
PAINLEVEL_OUTOF10: 10
PAINLEVEL_OUTOF10: 10
PAINLEVEL_OUTOF10: 6

## 2025-02-03 ASSESSMENT — PAIN DESCRIPTION - LOCATION
LOCATION: HEAD
LOCATION: HEAD

## 2025-02-03 ASSESSMENT — ENCOUNTER SYMPTOMS
VOMITING: 1
COUGH: 1
NAUSEA: 1
RHINORRHEA: 1

## 2025-02-03 ASSESSMENT — PAIN - FUNCTIONAL ASSESSMENT: PAIN_FUNCTIONAL_ASSESSMENT: 0-10

## 2025-02-03 NOTE — ED PROVIDER NOTES
[facility] URGENT CARE  Urgent Care Encounter       CHIEF COMPLAINT       Chief Complaint   Patient presents with    Headache       Nurses Notes reviewed and I agree except as noted in the HPI.  HISTORY OF PRESENT ILLNESS   Ana Laura Daniels is a 64 y.o. female who presents for headache, body aches, chills, fatigue, nausea, vomiting.  Symptoms x 4 days.  The patient reports that this is likely the flu or something similar, however, she has never had a headache like this before.  She has tried Tylenol and ibuprofen with no relief of the headache.  Patient reports she vomited twice today because of the headache.  Patient is headache gradually had an onset this was not thunderclap in nature.  She denies any neck pain or stiffness.  No trauma    HPI    REVIEW OF SYSTEMS     Review of Systems   Constitutional:  Positive for activity change, chills, fatigue and fever.   HENT:  Positive for congestion and rhinorrhea.    Respiratory:  Positive for cough.    Gastrointestinal:  Positive for nausea and vomiting.   Neurological:  Positive for dizziness and headaches.       PAST MEDICAL HISTORY   History reviewed. No pertinent past medical history.    SURGICALHISTORY     Patient  has a past surgical history that includes Tonsillectomy; Adenoidectomy; Appendectomy; Dilation and curettage of uterus; Myringotomy Tympanostomy Tube Placement; Cataract removal (Right, 11/12/15); and Skin cancer excision (Right, 11/11/15).    CURRENT MEDICATIONS       Discharge Medication List as of 2/3/2025  2:30 PM        CONTINUE these medications which have NOT CHANGED    Details   ibuprofen (ADVIL;MOTRIN) 200 MG tablet Take 1 tablet by mouth every 6 hours as needed for PainHistorical Med      Omega-3 Fatty Acids (FISH OIL) 600 MG CAPS Take by mouth dailyHistorical Med      PARoxetine (PAXIL) 20 MG tablet TAKE 1 TABLET BY MOUTH EVERY DAY IN THE MORNING, Disp-90 tablet, R-1Normal      finasteride (PROSCAR) 5 MG tablet Take 1 tablet by mouth  Full passive range of motion without pain and normal range of motion. No pain with movement.   Skin:     General: Skin is warm and dry.      Capillary Refill: Capillary refill takes less than 2 seconds.   Neurological:      General: No focal deficit present.      Mental Status: She is alert and oriented to person, place, and time.   Psychiatric:         Mood and Affect: Mood normal.         DIAGNOSTIC RESULTS     Labs:No results found for this visit on 02/03/25.    IMAGING:    No orders to display         EKG:      URGENT CARE COURSE:     Vitals:    02/03/25 1252 02/03/25 1422   BP: 119/77 109/78   Pulse: 86 86   Resp: 18    Temp: 98.1 °F (36.7 °C)    SpO2: 95%        Medications   ketorolac (TORADOL) injection 60 mg (60 mg IntraMUSCular Given 2/3/25 1351)   diphenhydrAMINE (BENADRYL) injection 25 mg (25 mg IntraMUSCular Given 2/3/25 1352)   metoclopramide (REGLAN) injection 10 mg (10 mg IntraMUSCular Given 2/3/25 1352)            PROCEDURES:  None    FINAL IMPRESSION      1. Suspected novel influenza A virus infection    2. Acute nonintractable headache, unspecified headache type          DISPOSITION/ PLAN     Patient presents for non-intractable headache related to what is likely influenza.  Patient was treated with Toradol, Reglan, Benadryl at the urgent care and had moderate improvement.  Patient is out of the window for influenza treatment.  I did prescribe Zofran for nausea/vomiting treatment at home.  Advised to continue Tylenol/ibuprofen.  Small amounts of fluids frequently.  Follow-up family physician return if no significant improvement in additional 3 to 4 days.  Go to the emergency department for worsening headache, uncontrolled vomiting, new or worsening symptoms.    PATIENT REFERRED TO:  Linda De La O MD  [address] [facility] [address]      DISCHARGE MEDICATIONS:  Discharge Medication List as of 2/3/2025  2:30 PM        START taking these medications    Details   ondansetron (ZOFRAN-ODT) 4

## 2025-02-03 NOTE — DISCHARGE INSTRUCTIONS
Zofran as directed as needed for nausea/vomiting    You may take Tylenol as needed for headache    You may take ibuprofen every 6 hours as needed for headache.  First dose 8 PM    Return for new or worsening symptoms

## 2025-02-03 NOTE — ED TRIAGE NOTES
Started Friday afternoon with a cough that become worse, fever 99.6 - 101 and the headache has become worse, cough worse, is photo sensitive, headache has become worse(throbbing), threw up a couple of times(head hurt so bad), temp 100.7 yesterday and this morning

## 2025-02-05 ENCOUNTER — TELEMEDICINE (OUTPATIENT)
Dept: FAMILY MEDICINE CLINIC | Age: 65
End: 2025-02-05

## 2025-02-05 DIAGNOSIS — J06.9 VIRAL URI: Primary | ICD-10-CM

## 2025-02-05 SDOH — ECONOMIC STABILITY: FOOD INSECURITY: WITHIN THE PAST 12 MONTHS, YOU WORRIED THAT YOUR FOOD WOULD RUN OUT BEFORE YOU GOT MONEY TO BUY MORE.: NEVER TRUE

## 2025-02-05 SDOH — ECONOMIC STABILITY: FOOD INSECURITY: WITHIN THE PAST 12 MONTHS, THE FOOD YOU BOUGHT JUST DIDN'T LAST AND YOU DIDN'T HAVE MONEY TO GET MORE.: NEVER TRUE

## 2025-02-05 ASSESSMENT — ENCOUNTER SYMPTOMS
DIARRHEA: 1
FLU SYMPTOMS: 1
NAUSEA: 0
ABDOMINAL PAIN: 0
VOMITING: 0
CONSTIPATION: 0
BLOOD IN STOOL: 0
ANAL BLEEDING: 0
EYES NEGATIVE: 1
COUGH: 1
CHEST TIGHTNESS: 0
SHORTNESS OF BREATH: 0
RHINORRHEA: 1

## 2025-02-05 ASSESSMENT — PATIENT HEALTH QUESTIONNAIRE - PHQ9
SUM OF ALL RESPONSES TO PHQ QUESTIONS 1-9: 0
2. FEELING DOWN, DEPRESSED OR HOPELESS: NOT AT ALL
SUM OF ALL RESPONSES TO PHQ QUESTIONS 1-9: 0
SUM OF ALL RESPONSES TO PHQ QUESTIONS 1-9: 0
1. LITTLE INTEREST OR PLEASURE IN DOING THINGS: NOT AT ALL
SUM OF ALL RESPONSES TO PHQ9 QUESTIONS 1 & 2: 0
SUM OF ALL RESPONSES TO PHQ QUESTIONS 1-9: 0

## 2025-02-05 NOTE — PROGRESS NOTES
Ana Laura agreed to Video Chat/Exam in presence of  and myself. Verified who was present in room with Ana Laura. Ana Laura informed the e-mail address used to [employer] cannot be used to contact the Provider, if they are any questions or concerns they need to call the office directly. Ana Laura stated understanding.

## 2025-02-05 NOTE — PROGRESS NOTES
Ana Laura Daniels, was evaluated through a synchronous (real-time) audio-video encounter. The patient (or guardian if applicable) is aware that this is a billable service, which includes applicable co-pays. This Virtual Visit was conducted with patient's (and/or legal guardian's) consent. Patient identification was verified, and a caregiver was present when appropriate.   The patient was located at Home: [address] [place] [id number]  Provider was located at Facility (Appt Dept): [address] [place] [id number]  Confirm you are appropriately licensed, registered, or certified to deliver care in the state where the patient is located as indicated above. If you are not or unsure, please re-schedule the visit: Yes, I confirm.     Ana Laura Daniels (:  1960) is a Established patient, presenting virtually for evaluation of the following:  Patient states that time she started to feel sick since last week on Friday afternoon, she had fever, chills, body aches headache, dizziness  and diarrhea.  She states that her headache was bad on 2/3/2025 that she went to urgent care as the headache was too bad.  She states that she was given medications and did improve her headache.  She states that she was told that she has influenza.  She states that she is finally starting to feel better today most for her symptoms are resolved but her ears are feeling blocked and crackling and popping.       Below is the assessment and plan developed based on review of pertinent history, physical exam, labs, studies, and medications.     Assessment & Plan  Viral URI              No follow-ups on file.       Subjective   Ear Fullness   Associated symptoms include coughing (non productive), diarrhea and rhinorrhea. Pertinent negatives include no abdominal pain, headaches, rash or vomiting.   Influenza  Associated symptoms include chills, congestion, coughing (non productive) and a fever (resolved since yesterday). Pertinent

## 2025-02-06 ENCOUNTER — TELEPHONE (OUTPATIENT)
Dept: FAMILY MEDICINE CLINIC | Age: 65
End: 2025-02-06

## 2025-02-06 NOTE — TELEPHONE ENCOUNTER
Pt called asking if you could suggest any decongestant medication that she could take to help with her congestion but she does not want to take something that is going to make her heart race or something that is going to make her hyper. She took Sudafed last night and she was up all night. She said she is not able to take that. She asked what you think about her taking coricidin hbp. She is asking for suggestions.    Ana Laura may be reached at [phone]

## 2025-03-26 ENCOUNTER — OFFICE VISIT (OUTPATIENT)
Dept: FAMILY MEDICINE CLINIC | Age: 65
End: 2025-03-26

## 2025-03-26 VITALS
BODY MASS INDEX: 26.24 KG/M2 | RESPIRATION RATE: 12 BRPM | WEIGHT: 142.6 LBS | HEIGHT: 62 IN | HEART RATE: 88 BPM | DIASTOLIC BLOOD PRESSURE: 74 MMHG | SYSTOLIC BLOOD PRESSURE: 124 MMHG

## 2025-03-26 DIAGNOSIS — R00.2 PALPITATIONS: ICD-10-CM

## 2025-03-26 DIAGNOSIS — R42 DIZZINESS: ICD-10-CM

## 2025-03-26 DIAGNOSIS — F41.9 ANXIETY: ICD-10-CM

## 2025-03-26 DIAGNOSIS — R06.09 DYSPNEA ON EXERTION: Primary | ICD-10-CM

## 2025-03-26 DIAGNOSIS — E55.9 VITAMIN D DEFICIENCY: ICD-10-CM

## 2025-03-26 DIAGNOSIS — E78.49 OTHER HYPERLIPIDEMIA: ICD-10-CM

## 2025-03-26 PROCEDURE — 1036F TOBACCO NON-USER: CPT | Performed by: FAMILY MEDICINE

## 2025-03-26 PROCEDURE — 3017F COLORECTAL CA SCREEN DOC REV: CPT | Performed by: FAMILY MEDICINE

## 2025-03-26 PROCEDURE — 99214 OFFICE O/P EST MOD 30 MIN: CPT | Performed by: FAMILY MEDICINE

## 2025-03-26 PROCEDURE — G8419 CALC BMI OUT NRM PARAM NOF/U: HCPCS | Performed by: FAMILY MEDICINE

## 2025-03-26 PROCEDURE — G8427 DOCREV CUR MEDS BY ELIG CLIN: HCPCS | Performed by: FAMILY MEDICINE

## 2025-03-26 PROCEDURE — 93000 ELECTROCARDIOGRAM COMPLETE: CPT | Performed by: FAMILY MEDICINE

## 2025-03-26 NOTE — PROGRESS NOTES
Pt is scheduled on Wed 4-2-25 at 9 am for Holter Monitor at [facility]. Pt will need to arrive at 845 am in the 2nd [facility].     Pt informed at appointment.   
COLLAGEN-ANTIMICROBIAL EX       Biotin 10 MG CAPS Take by mouth daily       No current facility-administered medications for this visit.     Orders Placed This Encounter   Procedures    CBC with Auto Differential     Standing Status:   Future     Expected Date:   3/26/2025     Expiration Date:   3/26/2026    Comprehensive Metabolic Panel     Standing Status:   Future     Expected Date:   3/26/2025     Expiration Date:   3/26/2026    TSH     Standing Status:   Future     Expected Date:   3/26/2025     Expiration Date:   3/26/2026    T4, Free     Standing Status:   Future     Expected Date:   3/26/2025     Expiration Date:   3/26/2026    EKG 12 lead     Standing Status:   Future     Expected Date:   3/26/2025     Expiration Date:   5/25/2025     Reason for Exam?:   Tachycardia    Extended cardiac holter monitor (3 days-14 day)     Standing Status:   Future     Expected Date:   3/26/2025     Expiration Date:   3/26/2026     Days to wear monitor?:   14       Continue current medications.    Return in about 10 days (around 4/5/2025).     Discussed use, benefit, and side effects of prescribed medications.  All patient questions answered.  Pt voiced understanding.  Instructed to continue current medications,diet and exercise.  Patient agreed with treatment plan.     Allergies, Problem List, Medications, Medical History, Family History, Surgical History and Tobacco History reviewed by provider.

## 2025-03-27 DIAGNOSIS — R06.09 DYSPNEA ON EXERTION: ICD-10-CM

## 2025-03-27 DIAGNOSIS — R00.2 PALPITATIONS: ICD-10-CM

## 2025-03-27 DIAGNOSIS — R42 DIZZINESS: ICD-10-CM

## 2025-03-29 ENCOUNTER — LAB (OUTPATIENT)
Dept: LAB | Age: 65
End: 2025-03-29

## 2025-03-29 DIAGNOSIS — R00.2 PALPITATIONS: ICD-10-CM

## 2025-03-29 DIAGNOSIS — R42 DIZZINESS: ICD-10-CM

## 2025-03-29 LAB
ALBUMIN SERPL BCG-MCNC: 4.1 G/DL (ref 3.4–4.9)
ALP SERPL-CCNC: 79 U/L (ref 35–104)
ALT SERPL W/O P-5'-P-CCNC: 17 U/L (ref 10–35)
ANION GAP SERPL CALC-SCNC: 8 MEQ/L (ref 8–16)
AST SERPL-CCNC: 24 U/L (ref 10–35)
BASOPHILS ABSOLUTE: 0 THOU/MM3 (ref 0–0.1)
BASOPHILS NFR BLD AUTO: 1.2 %
BILIRUB SERPL-MCNC: 0.3 MG/DL (ref 0.3–1.2)
BUN SERPL-MCNC: 19 MG/DL (ref 8–23)
CALCIUM SERPL-MCNC: 9.4 MG/DL (ref 8.8–10.2)
CHLORIDE SERPL-SCNC: 105 MEQ/L (ref 98–111)
CO2 SERPL-SCNC: 28 MEQ/L (ref 22–29)
CREAT SERPL-MCNC: 0.7 MG/DL (ref 0.5–0.9)
DEPRECATED RDW RBC AUTO: 44 FL (ref 35–45)
EOSINOPHIL NFR BLD AUTO: 3 %
EOSINOPHILS ABSOLUTE: 0.1 THOU/MM3 (ref 0–0.4)
ERYTHROCYTE [DISTWIDTH] IN BLOOD BY AUTOMATED COUNT: 12.6 % (ref 11.5–14.5)
GFR SERPL CREATININE-BSD FRML MDRD: > 90 ML/MIN/1.73M2
GLUCOSE SERPL-MCNC: 92 MG/DL (ref 74–109)
HCT VFR BLD AUTO: 43.1 % (ref 37–47)
HGB BLD-MCNC: 14.4 GM/DL (ref 12–16)
IMM GRANULOCYTES # BLD AUTO: 0 THOU/MM3 (ref 0–0.07)
IMM GRANULOCYTES NFR BLD AUTO: 0 %
LYMPHOCYTES ABSOLUTE: 1.1 THOU/MM3 (ref 1–4.8)
LYMPHOCYTES NFR BLD AUTO: 33.8 %
MCH RBC QN AUTO: 31.9 PG (ref 26–33)
MCHC RBC AUTO-ENTMCNC: 33.4 GM/DL (ref 32.2–35.5)
MCV RBC AUTO: 95.4 FL (ref 81–99)
MONOCYTES ABSOLUTE: 0.2 THOU/MM3 (ref 0.4–1.3)
MONOCYTES NFR BLD AUTO: 7.1 %
NEUTROPHILS ABSOLUTE: 1.9 THOU/MM3 (ref 1.8–7.7)
NEUTROPHILS NFR BLD AUTO: 54.9 %
NRBC BLD AUTO-RTO: 0 /100 WBC
PLATELET # BLD AUTO: 136 THOU/MM3 (ref 130–400)
PMV BLD AUTO: 12.6 FL (ref 9.4–12.4)
POTASSIUM SERPL-SCNC: 4.4 MEQ/L (ref 3.5–5.2)
PROT SERPL-MCNC: 6.1 G/DL (ref 6.4–8.3)
RBC # BLD AUTO: 4.52 MILL/MM3 (ref 4.2–5.4)
SODIUM SERPL-SCNC: 141 MEQ/L (ref 135–145)
T4 FREE SERPL-MCNC: 1.1 NG/DL (ref 0.92–1.68)
TSH SERPL DL<=0.05 MIU/L-ACNC: 0.83 UIU/ML (ref 0.27–4.2)
WBC # BLD AUTO: 3.4 THOU/MM3 (ref 4.8–10.8)

## 2025-04-04 ENCOUNTER — RESULTS FOLLOW-UP (OUTPATIENT)
Dept: FAMILY MEDICINE CLINIC | Age: 65
End: 2025-04-04

## 2025-04-19 NOTE — PROGRESS NOTES
[facility] [place] [facility]  [facility] CARDIOLOGY  [address]  [address]  [place] [address]  Dept: [phone]  Dept Fax: [phone]  Loc: [phone]    Visit Date: 4/22/2025  Ms. Daniels is a 65 y.o. female who presented for:  New referral  Palpitations   Dizziness   HPI:   Ana Laura Daniels is a pleasant 65 y.o. female who  has no past medical history on file. Her FH is positive for atrial fibrillation in her mother. Denies smoking. She was referred for dizziness, palpitations, SCHMID. EKG revealed normal sinus rhythm. The patient reports recurrent palpitations (daily since March 2025). She also reports episodes of chest pains, sharp, left sided, does not radiate, associated with shortness of breath. Has dyspnea on exertion, increased in the past few weeks. Patient denies dizziness, syncope, leg swelling or weight gain.       Current Outpatient Medications:     ibuprofen (ADVIL;MOTRIN) 200 MG tablet, Take 1 tablet by mouth every 6 hours as needed for Pain, Disp: , Rfl:     ondansetron (ZOFRAN-ODT) 4 MG disintegrating tablet, Take 1 tablet by mouth 3 times daily as needed for Nausea or Vomiting, Disp: 12 tablet, Rfl: 0    Omega-3 Fatty Acids (FISH OIL) 600 MG CAPS, Take by mouth daily, Disp: , Rfl:     PARoxetine (PAXIL) 20 MG tablet, TAKE 1 TABLET BY MOUTH EVERY DAY IN THE MORNING, Disp: 90 tablet, Rfl: 1    finasteride (PROSCAR) 5 MG tablet, Take 1 tablet by mouth daily, Disp: , Rfl:     Clobetasol Propionate 0.05 % SHAM, 1(ONE) APPLICATION(S) TOPICAL EVERY DAY, Disp: , Rfl:     fluticasone (FLONASE) 50 MCG/ACT nasal spray, SPRAY 2 SPRAYS INTO EACH NOSTRIL EVERY DAY, Disp: 48 g, Rfl: 1    b complex vitamins capsule, Take 1 capsule by mouth daily, Disp: , Rfl:     Cholecalciferol (VITAMIN D3) 50 MCG (2000 UT) CAPS, Take 2 capsules by mouth daily, Disp: , Rfl:     COLLAGEN-ANTIMICROBIAL EX, , Disp: , Rfl:     Biotin 10 MG CAPS, Take by mouth daily, Disp: , Rfl:     Past Medical

## 2025-04-22 ENCOUNTER — OFFICE VISIT (OUTPATIENT)
Dept: CARDIOLOGY CLINIC | Age: 65
End: 2025-04-22
Payer: COMMERCIAL

## 2025-04-22 VITALS
HEART RATE: 86 BPM | HEIGHT: 63 IN | WEIGHT: 140 LBS | SYSTOLIC BLOOD PRESSURE: 136 MMHG | DIASTOLIC BLOOD PRESSURE: 80 MMHG | BODY MASS INDEX: 24.8 KG/M2

## 2025-04-22 DIAGNOSIS — R07.9 CHEST PAIN, UNSPECIFIED TYPE: ICD-10-CM

## 2025-04-22 DIAGNOSIS — I20.9 ANGINA PECTORIS: ICD-10-CM

## 2025-04-22 DIAGNOSIS — R00.2 PALPITATIONS: Primary | ICD-10-CM

## 2025-04-22 PROCEDURE — 99204 OFFICE O/P NEW MOD 45 MIN: CPT | Performed by: INTERNAL MEDICINE

## 2025-04-22 NOTE — PATIENT INSTRUCTIONS
Your nurses today were Nikhil  Your provider today was Dr. Thakur  Office phone number [phone]

## 2025-04-22 NOTE — PROGRESS NOTES
NP referral for palpitations  Reports she will have blackness come over vision  Some dizziness  Denies any LOC  Sharp pains in chest that do not last long  Started in March

## 2025-05-09 ENCOUNTER — RESULTS FOLLOW-UP (OUTPATIENT)
Dept: CARDIOLOGY CLINIC | Age: 65
End: 2025-05-09

## 2025-05-09 ENCOUNTER — HOSPITAL ENCOUNTER (OUTPATIENT)
Dept: NUCLEAR MEDICINE | Age: 65
Discharge: HOME | End: 2025-05-09
Attending: INTERNAL MEDICINE
Payer: COMMERCIAL

## 2025-05-09 ENCOUNTER — HOSPITAL ENCOUNTER (OUTPATIENT)
Age: 65
Discharge: HOME | End: 2025-05-11
Attending: INTERNAL MEDICINE
Payer: COMMERCIAL

## 2025-05-09 DIAGNOSIS — R00.2 PALPITATIONS: ICD-10-CM

## 2025-05-09 DIAGNOSIS — R07.9 CHEST PAIN, UNSPECIFIED TYPE: ICD-10-CM

## 2025-05-09 DIAGNOSIS — I20.9 ANGINA PECTORIS: ICD-10-CM

## 2025-05-09 LAB
ECHO AO ASC DIAM: 3.1 CM
ECHO AV CUSP MM: 1.9 CM
ECHO AV PEAK GRADIENT: 6 MMHG
ECHO AV PEAK VELOCITY: 1.2 M/S
ECHO AV VELOCITY RATIO: 0.83
ECHO EST RA PRESSURE: 3 MMHG
ECHO LA AREA 2C: 16.1 CM2
ECHO LA AREA 4C: 14.9 CM2
ECHO LA DIAMETER: 3.3 CM
ECHO LA MAJOR AXIS: 5 CM
ECHO LA MINOR AXIS: 5.1 CM
ECHO LA VOL BP: 38 ML (ref 22–52)
ECHO LA VOL MOD A2C: 42 ML (ref 22–52)
ECHO LA VOL MOD A4C: 34 ML (ref 22–52)
ECHO LV E' LATERAL VELOCITY: 7.4 CM/S
ECHO LV E' SEPTAL VELOCITY: 5.5 CM/S
ECHO LV EF PHYSICIAN: 65 %
ECHO LV FRACTIONAL SHORTENING: 36 % (ref 28–44)
ECHO LV INTERNAL DIMENSION DIASTOLIC: 4.7 CM (ref 3.9–5.3)
ECHO LV INTERNAL DIMENSION SYSTOLIC: 3 CM
ECHO LV ISOVOLUMETRIC RELAXATION TIME (IVRT): 99 MS
ECHO LV IVSD: 1 CM (ref 0.6–0.9)
ECHO LV MASS 2D: 153.4 G (ref 67–162)
ECHO LV POSTERIOR WALL DIASTOLIC: 0.9 CM (ref 0.6–0.9)
ECHO LV RELATIVE WALL THICKNESS RATIO: 0.38
ECHO LVOT PEAK GRADIENT: 4 MMHG
ECHO LVOT PEAK VELOCITY: 1 M/S
ECHO MV A VELOCITY: 0.71 M/S
ECHO MV E DECELERATION TIME (DT): 303 MS
ECHO MV E VELOCITY: 0.75 M/S
ECHO MV E/A RATIO: 1.06
ECHO MV E/E' LATERAL: 10.14
ECHO MV E/E' RATIO (AVERAGED): 11.89
ECHO MV E/E' SEPTAL: 13.64
ECHO MV REGURGITANT PEAK GRADIENT: 104 MMHG
ECHO MV REGURGITANT PEAK VELOCITY: 5.1 M/S
ECHO PV MAX VELOCITY: 0.8 M/S
ECHO PV PEAK GRADIENT: 3 MMHG
ECHO RIGHT VENTRICULAR SYSTOLIC PRESSURE (RVSP): 26 MMHG
ECHO RV INTERNAL DIMENSION: 2.5 CM
ECHO RV TAPSE: 1.8 CM (ref 1.7–?)
ECHO TV E WAVE: 0.8 M/S
ECHO TV REGURGITANT MAX VELOCITY: 2.41 M/S
ECHO TV REGURGITANT PEAK GRADIENT: 23 MMHG
NUC STRESS EJECTION FRACTION: 75 %
STRESS BASELINE DIAS BP: 77 MMHG
STRESS BASELINE HR: 73 BPM
STRESS BASELINE SYS BP: 123 MMHG
STRESS ESTIMATED WORKLOAD: 1 METS
STRESS PEAK DIAS BP: 75 MMHG
STRESS PEAK SYS BP: 137 MMHG
STRESS PERCENT HR ACHIEVED: 72 %
STRESS POST PEAK HR: 112 BPM
STRESS RATE PRESSURE PRODUCT: NORMAL BPM*MMHG
STRESS STAGE 1 BP: NORMAL MMHG
STRESS STAGE 1 DURATION: 1 MIN:SEC
STRESS STAGE 1 HR: 101 BPM
STRESS STAGE 2 BP: NORMAL MMHG
STRESS STAGE 2 DURATION: 1 MIN:SEC
STRESS STAGE 2 HR: 111 BPM
STRESS STAGE 3 BP: NORMAL MMHG
STRESS STAGE 3 DURATION: 1 MIN:SEC
STRESS STAGE 3 HR: 102 BPM
STRESS STAGE RECOVERY 1 BP: NORMAL MMHG
STRESS STAGE RECOVERY 1 DURATION: 1 MIN:SEC
STRESS STAGE RECOVERY 1 HR: 102 BPM
STRESS STAGE RECOVERY 2 BP: NORMAL MMHG
STRESS STAGE RECOVERY 2 DURATION: 1 MIN:SEC
STRESS STAGE RECOVERY 2 HR: 93 BPM
STRESS STAGE RECOVERY 3 BP: NORMAL MMHG
STRESS STAGE RECOVERY 3 DURATION: 1 MIN:SEC
STRESS STAGE RECOVERY 3 HR: 91 BPM
STRESS STAGE RECOVERY 4 BP: NORMAL MMHG
STRESS STAGE RECOVERY 4 DURATION: 2 MIN:SEC
STRESS STAGE RECOVERY 4 HR: 89 BPM
STRESS TARGET HR: 156 BPM
TID: 1.16

## 2025-05-09 PROCEDURE — 3430000000 HC RX DIAGNOSTIC RADIOPHARMACEUTICAL: Performed by: INTERNAL MEDICINE

## 2025-05-09 PROCEDURE — A9500 TC99M SESTAMIBI: HCPCS | Performed by: INTERNAL MEDICINE

## 2025-05-09 PROCEDURE — 78452 HT MUSCLE IMAGE SPECT MULT: CPT

## 2025-05-09 PROCEDURE — 93306 TTE W/DOPPLER COMPLETE: CPT

## 2025-05-09 PROCEDURE — 93018 CV STRESS TEST I&R ONLY: CPT | Performed by: INTERNAL MEDICINE

## 2025-05-09 PROCEDURE — 78452 HT MUSCLE IMAGE SPECT MULT: CPT | Performed by: INTERNAL MEDICINE

## 2025-05-09 PROCEDURE — 93017 CV STRESS TEST TRACING ONLY: CPT

## 2025-05-09 PROCEDURE — 93306 TTE W/DOPPLER COMPLETE: CPT | Performed by: INTERNAL MEDICINE

## 2025-05-09 PROCEDURE — 93016 CV STRESS TEST SUPVJ ONLY: CPT | Performed by: INTERNAL MEDICINE

## 2025-05-09 PROCEDURE — 6360000002 HC RX W HCPCS: Performed by: INTERNAL MEDICINE

## 2025-05-09 RX ORDER — REGADENOSON 0.08 MG/ML
0.4 INJECTION, SOLUTION INTRAVENOUS
Status: COMPLETED | OUTPATIENT
Start: 2025-05-09 | End: 2025-05-09

## 2025-05-09 RX ORDER — TETRAKIS(2-METHOXYISOBUTYLISOCYANIDE)COPPER(I) TETRAFLUOROBORATE 1 MG/ML
34.8 INJECTION, POWDER, LYOPHILIZED, FOR SOLUTION INTRAVENOUS
Status: COMPLETED | OUTPATIENT
Start: 2025-05-09 | End: 2025-05-09

## 2025-05-09 RX ORDER — TETRAKIS(2-METHOXYISOBUTYLISOCYANIDE)COPPER(I) TETRAFLUOROBORATE 1 MG/ML
10 INJECTION, POWDER, LYOPHILIZED, FOR SOLUTION INTRAVENOUS
Status: COMPLETED | OUTPATIENT
Start: 2025-05-09 | End: 2025-05-09

## 2025-05-09 RX ADMIN — Medication 10 MILLICURIE: at 08:45

## 2025-05-09 RX ADMIN — Medication 34.8 MILLICURIE: at 10:02

## 2025-05-09 RX ADMIN — REGADENOSON 0.4 MG: 0.08 INJECTION, SOLUTION INTRAVENOUS at 10:00

## 2025-05-16 ENCOUNTER — HOSPITAL ENCOUNTER (OUTPATIENT)
Age: 65
Discharge: HOME | End: 2025-05-18
Attending: INTERNAL MEDICINE
Payer: COMMERCIAL

## 2025-05-16 PROCEDURE — 93225 XTRNL ECG REC<48 HRS REC: CPT

## 2025-05-18 LAB
ACQUISITION DURATION: NORMAL S
AVERAGE HEART RATE: 74 BPM
HOOKUP DATE: NORMAL
HOOKUP TIME: NORMAL
MAX HEART RATE TIME/DATE: NORMAL
MAX HEART RATE: 117 BPM
MIN HEART RATE TIME/DATE: NORMAL
MIN HEART RATE: 54 BPM
NUMBER OF QRS COMPLEXES: NORMAL
NUMBER OF SUPRAVENTRICULAR COUPLETS: 5
NUMBER OF SUPRAVENTRICULAR ECTOPICS: 202
NUMBER OF SUPRAVENTRICULAR ISOLATED BEATS: 169
NUMBER OF VENTRICULAR BIGEMINAL CYCLES: 0
NUMBER OF VENTRICULAR COUPLETS: 0
NUMBER OF VENTRICULAR ECTOPICS: 0

## 2025-05-19 PROCEDURE — 93227 XTRNL ECG REC<48 HR R&I: CPT | Performed by: INTERNAL MEDICINE

## 2025-05-27 DIAGNOSIS — R42 VERTIGO: ICD-10-CM

## 2025-05-27 DIAGNOSIS — H91.8X3 ASYMMETRICAL HEARING LOSS: ICD-10-CM

## 2025-05-27 DIAGNOSIS — H69.92 ETD (EUSTACHIAN TUBE DYSFUNCTION), LEFT: ICD-10-CM

## 2025-05-27 DIAGNOSIS — H90.72 MIXED CONDUCTIVE AND SENSORINEURAL HEARING LOSS OF LEFT EAR WITH UNRESTRICTED HEARING OF RIGHT EAR: Primary | ICD-10-CM

## 2025-05-27 DIAGNOSIS — H93.8X2 FULLNESS IN LEFT EAR: ICD-10-CM

## 2025-05-27 DIAGNOSIS — J30.9 ALLERGIC RHINITIS, UNSPECIFIED SEASONALITY, UNSPECIFIED TRIGGER: ICD-10-CM

## 2025-05-28 NOTE — PROGRESS NOTES
[facility] [place] [facility]  [facility] EAR, NOSE AND THROAT  [address]  [address]  [facility] [address]  Dept: [phone]  Dept Fax: [phone]  Loc: [phone]    Ana Laura Daniels is a 65 y.o. female who was referred by No ref. provider found and I reviewed their note for:  Chief Complaint   Patient presents with    Results     Patient here for Audiogram results.    .     HPI:     Ana Laura Daniels is a 65 y.o. female presenting for follow up of asymmetric hearing loss and chronic ear fullness iso patulous eustachian tube. Pt endorses her symptoms are stable.     Last visit Dr. Lewis 10/8/2024:  Ana Laura Daniels is a 64 y.o. female who presents today for evaluation of chronic fullness in her left ear.  Audio from April, 2024 is reviewed.  This suggests a mild asymmetry in the hearing and a conductive component,, but the tympanograms were classic type A.     Initial visit 6/11/2024:  Hearing Loss: Patient presents today with complaints of hearing loss. Onset of symptoms was gradual, beginning since she was a kid. Symptoms have been gradually worsening since that time. Symptoms include: hearing loss involving both ears, difficult with voices in crowded rooms, and associated tinnitus. Left ear feels plugged currently, but not painful. She states the plugged sensation is subtle/mild currently, but it can vary to very severe. There is not a history of noise exposure. There is not a family history of early hearing loss. She has had 4-5 sets of tubes in her life, 3-4 as an adult and once as a child. She has noticed if she lays on her right side, she can't hear out of the left but hasn't had the issue vice versa. Tinnitus started ~6 months ago on the left only. She reports it is constant, nonpulsatile, but occasionally she hears her pulse in the left ear. She denies a known history of allergies, but questions if she has them. She has used Flonase in the past and helped her nose/congestion, but

## 2025-05-29 ENCOUNTER — OFFICE VISIT (OUTPATIENT)
Dept: ENT CLINIC | Age: 65
End: 2025-05-29
Payer: COMMERCIAL

## 2025-05-29 ENCOUNTER — HOSPITAL ENCOUNTER (OUTPATIENT)
Dept: AUDIOLOGY | Age: 65
Discharge: HOME | End: 2025-05-29
Payer: COMMERCIAL

## 2025-05-29 VITALS
SYSTOLIC BLOOD PRESSURE: 116 MMHG | BODY MASS INDEX: 25.37 KG/M2 | OXYGEN SATURATION: 98 % | HEART RATE: 74 BPM | WEIGHT: 143.2 LBS | DIASTOLIC BLOOD PRESSURE: 78 MMHG | HEIGHT: 63 IN | TEMPERATURE: 98 F

## 2025-05-29 DIAGNOSIS — H90.72 MIXED CONDUCTIVE AND SENSORINEURAL HEARING LOSS OF LEFT EAR WITH UNRESTRICTED HEARING OF RIGHT EAR: ICD-10-CM

## 2025-05-29 DIAGNOSIS — H91.8X3 ASYMMETRICAL HEARING LOSS: Primary | ICD-10-CM

## 2025-05-29 DIAGNOSIS — H69.92 ETD (EUSTACHIAN TUBE DYSFUNCTION), LEFT: ICD-10-CM

## 2025-05-29 PROCEDURE — 92557 COMPREHENSIVE HEARING TEST: CPT | Performed by: AUDIOLOGIST

## 2025-05-29 PROCEDURE — 99213 OFFICE O/P EST LOW 20 MIN: CPT

## 2025-05-29 PROCEDURE — 92567 TYMPANOMETRY: CPT | Performed by: AUDIOLOGIST

## 2025-05-29 RX ORDER — HYDROXYCHLOROQUINE SULFATE 200 MG/1
TABLET, FILM COATED ORAL
COMMUNITY
Start: 2025-05-28

## 2025-05-29 NOTE — PROGRESS NOTES
AUDIOLOGICAL EVALUATION      REASON FOR TESTING:  Audiometric evaluation per the request of Michelle Tejada PA-C, due to the diagnosis of mixed conductive and sensorineural hearing loss of left ear with unrestricted hearing of right ear, asymmetrical hearing loss, fullness in left ear, eustachian tube dysfunction (left), allergic rhinitis (unspecified seasonality, unspecified trigger) and vertigo. The patient does not report any noticeable change in hearing. She states that her left ear continues to feel plugged. She continues to have left pulsatile tinnitus that is sometimes a high pitched ringing. She denies any otalgia and vertigo. CT noted in patient chart 7/11/24. Previous brain MRI noted 6/03/13.     Previous results on 10/10/24: Pure tone audiogram indicates normal to borderline normal hearing sensitivity in the right ear and mild to moderately-severe mixed hearing loss in the left ear. Speech reception thresholds agree with pure tone averages, bilaterally. Word recognition scores are excellent, bilaterally, with speech presented at 55dB in the right ear and 65dB in the left ear. Tympanometry indicates normal ear canal volume and peak pressure with slightly hypercompliant tympanic membrane mobility in the left ear (1.61mmho) compared to the right (1.13mmho). Patulous eustachian tube testing, performed per the request of referring physician, was negative, bilaterally.     OTOSCOPY: Clear canal with normal appearing tympanic membrane for both ears.       AUDIOGRAM        Reliability: Good    COMMENTS: Pure tone audiometry revealed normal/borderline normal hearing sloping to mild hearing loss at 6KHz & 8KHz in the right ear and mild to moderate mixed hearing loss in the left ear. Word recognition scores are excellent, bilaterally. Tympanometry indicates normal ear canal volume and peak pressure with slightly hypercompliant tympanic membrane mobility in the left ear (1.58 mmho) compared to the right (1.09 mmho).